# Patient Record
Sex: MALE | Race: WHITE | Employment: OTHER | URBAN - METROPOLITAN AREA
[De-identification: names, ages, dates, MRNs, and addresses within clinical notes are randomized per-mention and may not be internally consistent; named-entity substitution may affect disease eponyms.]

---

## 2017-03-27 ENCOUNTER — HOSPITAL ENCOUNTER (OUTPATIENT)
Dept: PHARMACY | Age: 67
Setting detail: THERAPIES SERIES
Discharge: HOME | End: 2017-03-27
Payer: MEDICARE

## 2017-03-27 LAB
INR BLD: 2.4
PROTIME: 28.8 SECONDS

## 2017-03-27 PROCEDURE — G0463 HOSPITAL OUTPT CLINIC VISIT: HCPCS

## 2017-03-27 PROCEDURE — 85610 PROTHROMBIN TIME: CPT

## 2017-04-24 ENCOUNTER — HOSPITAL ENCOUNTER (OUTPATIENT)
Dept: PHARMACY | Age: 67
Setting detail: THERAPIES SERIES
Discharge: HOME | End: 2017-04-24
Payer: MEDICARE

## 2017-04-24 DIAGNOSIS — I82.409 DEEP VEIN THROMBOSIS (DVT) OF LOWER EXTREMITY, UNSPECIFIED CHRONICITY, UNSPECIFIED LATERALITY, UNSPECIFIED VEIN (HCC): ICD-10-CM

## 2017-04-24 LAB
INR BLD: 2.6
PROTIME: 31.8 SECONDS

## 2017-04-24 PROCEDURE — G0463 HOSPITAL OUTPT CLINIC VISIT: HCPCS

## 2017-04-24 PROCEDURE — 85610 PROTHROMBIN TIME: CPT

## 2017-05-22 ENCOUNTER — HOSPITAL ENCOUNTER (OUTPATIENT)
Dept: PHARMACY | Age: 67
Setting detail: THERAPIES SERIES
Discharge: HOME | End: 2017-05-22
Payer: MEDICARE

## 2017-05-22 DIAGNOSIS — I82.409 DEEP VEIN THROMBOSIS (DVT) OF LOWER EXTREMITY, UNSPECIFIED CHRONICITY, UNSPECIFIED LATERALITY, UNSPECIFIED VEIN (HCC): ICD-10-CM

## 2017-05-22 LAB
INR BLD: 1.7
PROTIME: 20.8 SECONDS

## 2017-05-22 PROCEDURE — 99211 OFF/OP EST MAY X REQ PHY/QHP: CPT

## 2017-05-22 PROCEDURE — 85610 PROTHROMBIN TIME: CPT

## 2017-06-19 ENCOUNTER — HOSPITAL ENCOUNTER (OUTPATIENT)
Dept: PHARMACY | Age: 67
Setting detail: THERAPIES SERIES
Discharge: HOME | End: 2017-06-19
Payer: MEDICARE

## 2017-06-19 DIAGNOSIS — I82.409 DEEP VEIN THROMBOSIS (DVT) OF LOWER EXTREMITY, UNSPECIFIED CHRONICITY, UNSPECIFIED LATERALITY, UNSPECIFIED VEIN (HCC): ICD-10-CM

## 2017-06-19 LAB
INR BLD: 2
PROTIME: 24.4 SECONDS

## 2017-06-19 PROCEDURE — 85610 PROTHROMBIN TIME: CPT

## 2017-06-19 PROCEDURE — 99211 OFF/OP EST MAY X REQ PHY/QHP: CPT

## 2017-07-17 ENCOUNTER — HOSPITAL ENCOUNTER (OUTPATIENT)
Dept: PHARMACY | Age: 67
Setting detail: THERAPIES SERIES
Discharge: HOME | End: 2017-07-17
Payer: MEDICARE

## 2017-07-17 DIAGNOSIS — I82.409 DEEP VEIN THROMBOSIS (DVT) OF LOWER EXTREMITY, UNSPECIFIED CHRONICITY, UNSPECIFIED LATERALITY, UNSPECIFIED VEIN (HCC): ICD-10-CM

## 2017-07-17 LAB
INR BLD: 2.4
PROTIME: 28.8 SECONDS

## 2017-07-17 PROCEDURE — 85610 PROTHROMBIN TIME: CPT

## 2017-07-17 PROCEDURE — 99211 OFF/OP EST MAY X REQ PHY/QHP: CPT

## 2017-07-17 RX ORDER — WARFARIN SODIUM 10 MG/1
5-10 TABLET ORAL DAILY
Qty: 30 TABLET | Refills: 2 | OUTPATIENT
Start: 2017-07-17 | End: 2017-09-11 | Stop reason: SDUPTHER

## 2017-08-14 ENCOUNTER — HOSPITAL ENCOUNTER (OUTPATIENT)
Dept: PHARMACY | Age: 67
Setting detail: THERAPIES SERIES
Discharge: HOME | End: 2017-08-14
Payer: MEDICARE

## 2017-08-14 DIAGNOSIS — I82.409 DEEP VEIN THROMBOSIS (DVT) OF LOWER EXTREMITY, UNSPECIFIED CHRONICITY, UNSPECIFIED LATERALITY, UNSPECIFIED VEIN (HCC): ICD-10-CM

## 2017-08-14 LAB
INR BLD: 2.7
PROTIME: 31.9 SECONDS

## 2017-08-14 PROCEDURE — 85610 PROTHROMBIN TIME: CPT

## 2017-08-14 PROCEDURE — 99211 OFF/OP EST MAY X REQ PHY/QHP: CPT

## 2017-09-07 ENCOUNTER — TELEPHONE (OUTPATIENT)
Dept: PULMONOLOGY | Age: 67
End: 2017-09-07

## 2017-09-07 DIAGNOSIS — Z99.89 OSA ON CPAP: Primary | ICD-10-CM

## 2017-09-07 DIAGNOSIS — I27.20 PULMONARY HYPERTENSION (HCC): ICD-10-CM

## 2017-09-07 DIAGNOSIS — J96.00 ACUTE RESPIRATORY FAILURE, UNSPECIFIED WHETHER WITH HYPOXIA OR HYPERCAPNIA (HCC): ICD-10-CM

## 2017-09-07 DIAGNOSIS — G47.33 OSA ON CPAP: Primary | ICD-10-CM

## 2017-09-11 ENCOUNTER — HOSPITAL ENCOUNTER (OUTPATIENT)
Dept: PHARMACY | Age: 67
Setting detail: THERAPIES SERIES
Discharge: HOME | End: 2017-09-11
Payer: MEDICARE

## 2017-09-11 LAB
INR BLD: 2.8
PROTIME: 33.1 SECONDS

## 2017-09-11 PROCEDURE — 99211 OFF/OP EST MAY X REQ PHY/QHP: CPT

## 2017-09-11 PROCEDURE — 85610 PROTHROMBIN TIME: CPT

## 2017-09-11 RX ORDER — WARFARIN SODIUM 10 MG/1
5-10 TABLET ORAL DAILY
Qty: 30 TABLET | Refills: 2 | OUTPATIENT
Start: 2017-09-11 | End: 2017-10-09 | Stop reason: SDUPTHER

## 2017-09-22 ENCOUNTER — OFFICE VISIT (OUTPATIENT)
Dept: PULMONOLOGY | Age: 67
End: 2017-09-22
Payer: MEDICARE

## 2017-09-22 VITALS
OXYGEN SATURATION: 93 % | SYSTOLIC BLOOD PRESSURE: 144 MMHG | HEART RATE: 83 BPM | RESPIRATION RATE: 18 BRPM | BODY MASS INDEX: 46.65 KG/M2 | WEIGHT: 315 LBS | HEIGHT: 69 IN | DIASTOLIC BLOOD PRESSURE: 74 MMHG

## 2017-09-22 DIAGNOSIS — I27.20 PULMONARY HYPERTENSION (HCC): ICD-10-CM

## 2017-09-22 DIAGNOSIS — Z99.89 OSA ON CPAP: Primary | ICD-10-CM

## 2017-09-22 DIAGNOSIS — J96.00 ACUTE RESPIRATORY FAILURE, UNSPECIFIED WHETHER WITH HYPOXIA OR HYPERCAPNIA (HCC): ICD-10-CM

## 2017-09-22 DIAGNOSIS — G47.33 OSA ON CPAP: Primary | ICD-10-CM

## 2017-09-22 DIAGNOSIS — E66.01 MORBID OBESITY DUE TO EXCESS CALORIES (HCC): ICD-10-CM

## 2017-09-22 DIAGNOSIS — I48.0 PAF (PAROXYSMAL ATRIAL FIBRILLATION) (HCC): ICD-10-CM

## 2017-09-22 PROCEDURE — 3017F COLORECTAL CA SCREEN DOC REV: CPT | Performed by: INTERNAL MEDICINE

## 2017-09-22 PROCEDURE — G8427 DOCREV CUR MEDS BY ELIG CLIN: HCPCS | Performed by: INTERNAL MEDICINE

## 2017-09-22 PROCEDURE — G8417 CALC BMI ABV UP PARAM F/U: HCPCS | Performed by: INTERNAL MEDICINE

## 2017-09-22 PROCEDURE — 99213 OFFICE O/P EST LOW 20 MIN: CPT | Performed by: INTERNAL MEDICINE

## 2017-09-22 PROCEDURE — 4040F PNEUMOC VAC/ADMIN/RCVD: CPT | Performed by: INTERNAL MEDICINE

## 2017-09-22 PROCEDURE — 1123F ACP DISCUSS/DSCN MKR DOCD: CPT | Performed by: INTERNAL MEDICINE

## 2017-09-22 PROCEDURE — 1036F TOBACCO NON-USER: CPT | Performed by: INTERNAL MEDICINE

## 2017-09-22 RX ORDER — LANOLIN ALCOHOL/MO/W.PET/CERES
1000 CREAM (GRAM) TOPICAL DAILY
COMMUNITY

## 2017-09-22 ASSESSMENT — ENCOUNTER SYMPTOMS
WHEEZING: 1
BACK PAIN: 1
EYES NEGATIVE: 1
SHORTNESS OF BREATH: 1
CHEST TIGHTNESS: 1
APNEA: 1

## 2017-10-09 ENCOUNTER — HOSPITAL ENCOUNTER (OUTPATIENT)
Dept: PHARMACY | Age: 67
Setting detail: THERAPIES SERIES
Discharge: HOME | End: 2017-10-09
Payer: MEDICARE

## 2017-10-09 LAB
INR BLD: 2.4
PROTIME: 29 SECONDS

## 2017-10-09 PROCEDURE — 99211 OFF/OP EST MAY X REQ PHY/QHP: CPT

## 2017-10-09 PROCEDURE — 85610 PROTHROMBIN TIME: CPT

## 2017-10-09 RX ORDER — WARFARIN SODIUM 10 MG/1
5-10 TABLET ORAL DAILY
Qty: 90 TABLET | Refills: 1 | OUTPATIENT
Start: 2017-10-09 | End: 2018-06-05 | Stop reason: SDUPTHER

## 2017-11-06 ENCOUNTER — HOSPITAL ENCOUNTER (OUTPATIENT)
Dept: PHARMACY | Age: 67
Setting detail: THERAPIES SERIES
Discharge: HOME | End: 2017-11-06
Payer: MEDICARE

## 2017-11-06 LAB
INR BLD: 1.5
PROTIME: 17.8 SECONDS

## 2017-11-06 PROCEDURE — 99211 OFF/OP EST MAY X REQ PHY/QHP: CPT

## 2017-11-06 PROCEDURE — 85610 PROTHROMBIN TIME: CPT

## 2017-11-06 NOTE — PROGRESS NOTES
Patient states he missed Saturday's dose and maybe missed another day last week as well. No bleeding or thromboembolic side effects noted. No significant med or dietary changes. No significant recent illness or disease state changes. PT/INR done in office per protocol. INR is 1.5 which is subtherapeutic as expected from missed doses. Warfarin regimen will be 10mg x 1, then continue usual regimen of 5mg Mon/Fri, 10mg all other days. Will retest in 4 weeks as he is usually therapeutic and stable at this regimen. Patient understands dosing directions and information discussed. Dosing schedule and follow up appointment given to patient. Progress note routed to referring physicians office.

## 2017-12-11 ENCOUNTER — HOSPITAL ENCOUNTER (OUTPATIENT)
Dept: PHARMACY | Age: 67
Setting detail: THERAPIES SERIES
Discharge: HOME | End: 2017-12-11
Payer: MEDICARE

## 2017-12-11 DIAGNOSIS — I82.409 DEEP VEIN THROMBOSIS (DVT) OF LOWER EXTREMITY, UNSPECIFIED CHRONICITY, UNSPECIFIED LATERALITY, UNSPECIFIED VEIN (HCC): ICD-10-CM

## 2017-12-11 LAB
INR BLD: 2.3
PROTIME: 27.9 SECONDS

## 2017-12-11 PROCEDURE — 85610 PROTHROMBIN TIME: CPT

## 2017-12-11 PROCEDURE — 99211 OFF/OP EST MAY X REQ PHY/QHP: CPT

## 2018-01-15 ENCOUNTER — HOSPITAL ENCOUNTER (OUTPATIENT)
Dept: PHARMACY | Age: 68
Setting detail: THERAPIES SERIES
Discharge: HOME | End: 2018-01-15
Payer: MEDICARE

## 2018-01-15 DIAGNOSIS — I82.409 DEEP VEIN THROMBOSIS (DVT) OF LOWER EXTREMITY, UNSPECIFIED CHRONICITY, UNSPECIFIED LATERALITY, UNSPECIFIED VEIN (HCC): ICD-10-CM

## 2018-01-15 LAB
INR BLD: 2.4
PROTIME: 29.4 SECONDS

## 2018-01-15 PROCEDURE — 85610 PROTHROMBIN TIME: CPT

## 2018-01-15 PROCEDURE — 99211 OFF/OP EST MAY X REQ PHY/QHP: CPT

## 2018-02-19 ENCOUNTER — HOSPITAL ENCOUNTER (OUTPATIENT)
Dept: PHARMACY | Age: 68
Setting detail: THERAPIES SERIES
Discharge: HOME | End: 2018-02-19
Payer: MEDICARE

## 2018-02-19 DIAGNOSIS — I82.409 DEEP VEIN THROMBOSIS (DVT) OF LOWER EXTREMITY, UNSPECIFIED CHRONICITY, UNSPECIFIED LATERALITY, UNSPECIFIED VEIN (HCC): ICD-10-CM

## 2018-02-19 LAB
INR BLD: 2.1
PROTIME: 25.3 SECONDS

## 2018-02-19 PROCEDURE — 99211 OFF/OP EST MAY X REQ PHY/QHP: CPT

## 2018-02-19 PROCEDURE — 85610 PROTHROMBIN TIME: CPT

## 2018-02-19 NOTE — PROGRESS NOTES
Patient states he may have missed 1 or 2 doses over the past month. No bleeding or thromboembolic side effects noted. No significant med or dietary changes. No significant recent illness or disease state changes. PT/INR done in office per protocol. INR is 2.1 which is therapeutic. Warfarin regimen will be continued at current dose. Will retest in 5 weeks. Patient understands dosing directions and information discussed. Dosing schedule and follow up appointment given to patient. Progress note routed to referring physicians office. Patient acknowledges working in consult agreement with pharmacist as referred by his/her physician.

## 2018-03-26 ENCOUNTER — HOSPITAL ENCOUNTER (OUTPATIENT)
Dept: PHARMACY | Age: 68
Setting detail: THERAPIES SERIES
Discharge: HOME | End: 2018-03-26
Payer: MEDICARE

## 2018-03-26 DIAGNOSIS — I82.409 DEEP VEIN THROMBOSIS (DVT) OF LOWER EXTREMITY, UNSPECIFIED CHRONICITY, UNSPECIFIED LATERALITY, UNSPECIFIED VEIN (HCC): ICD-10-CM

## 2018-03-26 LAB
INR BLD: 1.7
PROTIME: 20.8 SECONDS

## 2018-03-26 PROCEDURE — 85610 PROTHROMBIN TIME: CPT

## 2018-03-26 PROCEDURE — 99211 OFF/OP EST MAY X REQ PHY/QHP: CPT

## 2018-03-26 NOTE — PROGRESS NOTES
Patient states may have missed some doses. No bleeding or thromboembolic side effects noted. No significant med or dietary changes. No significant recent illness or disease state changes. PT/INR done in office per protocol. INR is 1.7 which is subtherapeutic. Warfarin regimen will be continued with 10mg x 1 then continue usual dosage 5mg M/F and 10mg AOD. Will retest in 4 weeks. Patient understands dosing directions and information discussed. Dosing schedule and follow up appointment given to patient. Progress note routed to referring physicians office. Patient acknowledges working in consult agreement with pharmacist as referred by his/her physician.

## 2018-04-24 ENCOUNTER — HOSPITAL ENCOUNTER (OUTPATIENT)
Dept: PHARMACY | Age: 68
Setting detail: THERAPIES SERIES
Discharge: HOME | End: 2018-04-24
Payer: MEDICARE

## 2018-04-24 DIAGNOSIS — I82.409 DEEP VEIN THROMBOSIS (DVT) OF LOWER EXTREMITY, UNSPECIFIED CHRONICITY, UNSPECIFIED LATERALITY, UNSPECIFIED VEIN (HCC): ICD-10-CM

## 2018-04-24 LAB
INR BLD: 2.9
PROTIME: 35.3 SECONDS

## 2018-04-24 PROCEDURE — 85610 PROTHROMBIN TIME: CPT

## 2018-04-24 PROCEDURE — 99211 OFF/OP EST MAY X REQ PHY/QHP: CPT

## 2018-05-21 ENCOUNTER — HOSPITAL ENCOUNTER (OUTPATIENT)
Dept: PHARMACY | Age: 68
Setting detail: THERAPIES SERIES
Discharge: HOME | End: 2018-05-21
Payer: MEDICARE

## 2018-05-21 DIAGNOSIS — I82.409 DEEP VEIN THROMBOSIS (DVT) OF LOWER EXTREMITY, UNSPECIFIED CHRONICITY, UNSPECIFIED LATERALITY, UNSPECIFIED VEIN (HCC): ICD-10-CM

## 2018-05-21 LAB
INR BLD: 2.9
PROTIME: 35.2 SECONDS

## 2018-05-21 PROCEDURE — 99211 OFF/OP EST MAY X REQ PHY/QHP: CPT

## 2018-05-21 PROCEDURE — 85610 PROTHROMBIN TIME: CPT

## 2018-05-25 DIAGNOSIS — I82.409 DEEP VEIN THROMBOSIS (DVT) OF LOWER EXTREMITY, UNSPECIFIED CHRONICITY, UNSPECIFIED LATERALITY, UNSPECIFIED VEIN (HCC): ICD-10-CM

## 2018-06-05 RX ORDER — WARFARIN SODIUM 10 MG/1
TABLET ORAL
Qty: 90 TABLET | Refills: 3 | OUTPATIENT
Start: 2018-06-05 | End: 2019-08-13

## 2018-06-18 ENCOUNTER — HOSPITAL ENCOUNTER (OUTPATIENT)
Dept: PHARMACY | Age: 68
Setting detail: THERAPIES SERIES
Discharge: HOME | End: 2018-06-18
Payer: MEDICARE

## 2018-06-18 DIAGNOSIS — I82.409 DEEP VEIN THROMBOSIS (DVT) OF LOWER EXTREMITY, UNSPECIFIED CHRONICITY, UNSPECIFIED LATERALITY, UNSPECIFIED VEIN (HCC): ICD-10-CM

## 2018-06-18 LAB
INR BLD: 2.6
PROTIME: 31 SECONDS

## 2018-06-18 PROCEDURE — 85610 PROTHROMBIN TIME: CPT

## 2018-06-18 PROCEDURE — 99211 OFF/OP EST MAY X REQ PHY/QHP: CPT

## 2018-07-16 ENCOUNTER — HOSPITAL ENCOUNTER (OUTPATIENT)
Dept: PHARMACY | Age: 68
Setting detail: THERAPIES SERIES
Discharge: HOME | End: 2018-07-16
Payer: MEDICARE

## 2018-07-16 DIAGNOSIS — I82.409 DEEP VEIN THROMBOSIS (DVT) OF LOWER EXTREMITY, UNSPECIFIED CHRONICITY, UNSPECIFIED LATERALITY, UNSPECIFIED VEIN (HCC): ICD-10-CM

## 2018-07-16 LAB
INR BLD: 1.5
PROTIME: 18 SECONDS

## 2018-07-16 PROCEDURE — 99211 OFF/OP EST MAY X REQ PHY/QHP: CPT

## 2018-07-16 PROCEDURE — 85610 PROTHROMBIN TIME: CPT

## 2018-07-30 ENCOUNTER — HOSPITAL ENCOUNTER (OUTPATIENT)
Dept: PHARMACY | Age: 68
Setting detail: THERAPIES SERIES
Discharge: HOME | End: 2018-07-30
Payer: MEDICARE

## 2018-07-30 DIAGNOSIS — I82.409 DEEP VEIN THROMBOSIS (DVT) OF LOWER EXTREMITY, UNSPECIFIED CHRONICITY, UNSPECIFIED LATERALITY, UNSPECIFIED VEIN (HCC): ICD-10-CM

## 2018-07-30 LAB
INR BLD: 2.7
PROTIME: 32.7 SECONDS

## 2018-07-30 PROCEDURE — 85610 PROTHROMBIN TIME: CPT

## 2018-07-30 PROCEDURE — 99211 OFF/OP EST MAY X REQ PHY/QHP: CPT

## 2018-07-30 RX ORDER — PREGABALIN 25 MG/1
25 CAPSULE ORAL 2 TIMES DAILY
Status: ON HOLD | COMMUNITY
End: 2019-09-05 | Stop reason: HOSPADM

## 2018-08-27 ENCOUNTER — HOSPITAL ENCOUNTER (OUTPATIENT)
Dept: PHARMACY | Age: 68
Setting detail: THERAPIES SERIES
Discharge: HOME | End: 2018-08-27
Payer: MEDICARE

## 2018-08-27 DIAGNOSIS — I82.409 DEEP VEIN THROMBOSIS (DVT) OF LOWER EXTREMITY, UNSPECIFIED CHRONICITY, UNSPECIFIED LATERALITY, UNSPECIFIED VEIN (HCC): ICD-10-CM

## 2018-08-27 LAB
INR BLD: 1.6
PROTIME: 19.2 SECONDS

## 2018-08-27 PROCEDURE — 85610 PROTHROMBIN TIME: CPT

## 2018-08-27 PROCEDURE — 99212 OFFICE O/P EST SF 10 MIN: CPT

## 2018-09-10 ENCOUNTER — HOSPITAL ENCOUNTER (OUTPATIENT)
Dept: PHARMACY | Age: 68
Setting detail: THERAPIES SERIES
Discharge: HOME | End: 2018-09-10
Payer: MEDICARE

## 2018-09-10 DIAGNOSIS — I82.409 DEEP VEIN THROMBOSIS (DVT) OF LOWER EXTREMITY, UNSPECIFIED CHRONICITY, UNSPECIFIED LATERALITY, UNSPECIFIED VEIN (HCC): ICD-10-CM

## 2018-09-10 LAB
INR BLD: 2.3
PROTIME: 28 SECONDS

## 2018-09-10 PROCEDURE — 85610 PROTHROMBIN TIME: CPT

## 2018-09-10 PROCEDURE — 99211 OFF/OP EST MAY X REQ PHY/QHP: CPT

## 2018-09-14 ENCOUNTER — OFFICE VISIT (OUTPATIENT)
Dept: PULMONOLOGY | Age: 68
End: 2018-09-14
Payer: MEDICARE

## 2018-09-14 VITALS
HEART RATE: 87 BPM | SYSTOLIC BLOOD PRESSURE: 132 MMHG | TEMPERATURE: 97 F | DIASTOLIC BLOOD PRESSURE: 73 MMHG | HEIGHT: 69 IN | OXYGEN SATURATION: 93 % | BODY MASS INDEX: 46.65 KG/M2 | WEIGHT: 315 LBS | RESPIRATION RATE: 12 BRPM

## 2018-09-14 DIAGNOSIS — J96.00 ACUTE RESPIRATORY FAILURE, UNSPECIFIED WHETHER WITH HYPOXIA OR HYPERCAPNIA (HCC): ICD-10-CM

## 2018-09-14 DIAGNOSIS — I48.0 PAF (PAROXYSMAL ATRIAL FIBRILLATION) (HCC): ICD-10-CM

## 2018-09-14 DIAGNOSIS — G47.33 OSA ON CPAP: Primary | ICD-10-CM

## 2018-09-14 DIAGNOSIS — E66.01 MORBID OBESITY DUE TO EXCESS CALORIES (HCC): ICD-10-CM

## 2018-09-14 DIAGNOSIS — Z99.89 OSA ON CPAP: Primary | ICD-10-CM

## 2018-09-14 DIAGNOSIS — I27.20 PULMONARY HYPERTENSION (HCC): ICD-10-CM

## 2018-09-14 PROCEDURE — G8417 CALC BMI ABV UP PARAM F/U: HCPCS | Performed by: INTERNAL MEDICINE

## 2018-09-14 PROCEDURE — G8427 DOCREV CUR MEDS BY ELIG CLIN: HCPCS | Performed by: INTERNAL MEDICINE

## 2018-09-14 PROCEDURE — 3017F COLORECTAL CA SCREEN DOC REV: CPT | Performed by: INTERNAL MEDICINE

## 2018-09-14 PROCEDURE — 1036F TOBACCO NON-USER: CPT | Performed by: INTERNAL MEDICINE

## 2018-09-14 PROCEDURE — 4040F PNEUMOC VAC/ADMIN/RCVD: CPT | Performed by: INTERNAL MEDICINE

## 2018-09-14 PROCEDURE — 1123F ACP DISCUSS/DSCN MKR DOCD: CPT | Performed by: INTERNAL MEDICINE

## 2018-09-14 PROCEDURE — 1101F PT FALLS ASSESS-DOCD LE1/YR: CPT | Performed by: INTERNAL MEDICINE

## 2018-09-14 PROCEDURE — 99213 OFFICE O/P EST LOW 20 MIN: CPT | Performed by: INTERNAL MEDICINE

## 2018-09-14 ASSESSMENT — ENCOUNTER SYMPTOMS
EYES NEGATIVE: 1
RESPIRATORY NEGATIVE: 1

## 2018-09-14 NOTE — PROGRESS NOTES
and time. Skin: Skin is warm and dry. Nursing note and vitals reviewed. Wt Readings from Last 3 Encounters:   09/14/18 (!) 430 lb (195 kg)   09/22/17 (!) 438 lb (198.7 kg)   12/13/16 (!) 412 lb (186.9 kg)       Results for orders placed or performed during the hospital encounter of 09/10/18   Protime-INR   Result Value Ref Range    INR 2.3     Protime 28 seconds       Assessment:      1. STEPH on CPAP    2. Pulmonary hypertension ([address]    3. Acute respiratory failure, unspecified whether with hypoxia or hypercapnia ([address]    4. PAF (paroxysmal atrial fibrillation) ([address]    5. Morbid obesity due to excess calories ([address]          Plan:      1. Continue weight loss. 2. Continue CPAP. 3. When eligible for a replacement machine, recommend auto titrating CPAP 8-16 cm water pressure. 4. Avoid sedative hypnotics and alcohol at bedtime. 5. Return in one year.       Electronically signed by Scott Strong DO on 9/14/2018 at 2:34 PM

## 2018-10-08 ENCOUNTER — HOSPITAL ENCOUNTER (OUTPATIENT)
Dept: PHARMACY | Age: 68
Setting detail: THERAPIES SERIES
Discharge: HOME | End: 2018-10-08
Payer: MEDICARE

## 2018-10-08 DIAGNOSIS — I82.409 DEEP VEIN THROMBOSIS (DVT) OF LOWER EXTREMITY, UNSPECIFIED CHRONICITY, UNSPECIFIED LATERALITY, UNSPECIFIED VEIN (HCC): ICD-10-CM

## 2018-10-08 LAB
INR BLD: 1.5
PROTIME: 17.7 SECONDS

## 2018-10-08 PROCEDURE — 90686 IIV4 VACC NO PRSV 0.5 ML IM: CPT | Performed by: INTERNAL MEDICINE

## 2018-10-08 PROCEDURE — G0009 ADMIN PNEUMOCOCCAL VACCINE: HCPCS | Performed by: INTERNAL MEDICINE

## 2018-10-08 PROCEDURE — 6360000002 HC RX W HCPCS: Performed by: INTERNAL MEDICINE

## 2018-10-08 PROCEDURE — 90670 PCV13 VACCINE IM: CPT | Performed by: INTERNAL MEDICINE

## 2018-10-08 PROCEDURE — G0008 ADMIN INFLUENZA VIRUS VAC: HCPCS | Performed by: INTERNAL MEDICINE

## 2018-10-08 PROCEDURE — 99212 OFFICE O/P EST SF 10 MIN: CPT

## 2018-10-08 PROCEDURE — 85610 PROTHROMBIN TIME: CPT

## 2018-10-08 RX ADMIN — PNEUMOCOCCAL 13-VALENT CONJUGATE VACCINE 0.5 ML: 2.2; 2.2; 2.2; 2.2; 2.2; 4.4; 2.2; 2.2; 2.2; 2.2; 2.2; 2.2; 2.2 INJECTION, SUSPENSION INTRAMUSCULAR at 08:06

## 2018-10-08 RX ADMIN — INFLUENZA A VIRUS A/MICHIGAN/45/2015 X-275 (H1N1) ANTIGEN (FORMALDEHYDE INACTIVATED), INFLUENZA A VIRUS A/SINGAPORE/INFIMH-16-0019/2016 IVR-186 (H3N2) ANTIGEN (FORMALDEHYDE INACTIVATED), INFLUENZA B VIRUS B/PHUKET/3073/2013 ANTIGEN (FORMALDEHYDE INACTIVATED), AND INFLUENZA B VIRUS B/MARYLAND/15/2016 BX-69A ANTIGEN (FORMALDEHYDE INACTIVATED) 0.5 ML: 15; 15; 15; 15 INJECTION, SUSPENSION INTRAMUSCULAR at 08:05

## 2018-10-22 ENCOUNTER — HOSPITAL ENCOUNTER (OUTPATIENT)
Dept: PHARMACY | Age: 68
Setting detail: THERAPIES SERIES
Discharge: HOME | End: 2018-10-22
Payer: MEDICARE

## 2018-10-22 DIAGNOSIS — I82.409 DEEP VEIN THROMBOSIS (DVT) OF LOWER EXTREMITY, UNSPECIFIED CHRONICITY, UNSPECIFIED LATERALITY, UNSPECIFIED VEIN (HCC): ICD-10-CM

## 2018-10-22 LAB
INR BLD: 2.2
PROTIME: 26.4 SECONDS

## 2018-10-22 PROCEDURE — 99211 OFF/OP EST MAY X REQ PHY/QHP: CPT

## 2018-10-22 PROCEDURE — 85610 PROTHROMBIN TIME: CPT

## 2018-11-19 ENCOUNTER — HOSPITAL ENCOUNTER (OUTPATIENT)
Dept: PHARMACY | Age: 68
Setting detail: THERAPIES SERIES
Discharge: HOME | End: 2018-11-19
Payer: MEDICARE

## 2018-11-19 DIAGNOSIS — I82.409 DEEP VEIN THROMBOSIS (DVT) OF LOWER EXTREMITY, UNSPECIFIED CHRONICITY, UNSPECIFIED LATERALITY, UNSPECIFIED VEIN (HCC): ICD-10-CM

## 2018-11-19 LAB
INR BLD: 2.3
PROTIME: 27.4 SECONDS

## 2018-11-19 PROCEDURE — 85610 PROTHROMBIN TIME: CPT

## 2018-11-19 PROCEDURE — 99211 OFF/OP EST MAY X REQ PHY/QHP: CPT

## 2018-12-17 ENCOUNTER — HOSPITAL ENCOUNTER (OUTPATIENT)
Dept: PHARMACY | Age: 68
Setting detail: THERAPIES SERIES
Discharge: HOME | End: 2018-12-17
Payer: MEDICARE

## 2018-12-17 DIAGNOSIS — I82.409 DEEP VEIN THROMBOSIS (DVT) OF LOWER EXTREMITY, UNSPECIFIED CHRONICITY, UNSPECIFIED LATERALITY, UNSPECIFIED VEIN (HCC): ICD-10-CM

## 2018-12-17 LAB
INR BLD: 2.4
PROTIME: 29.2 SECONDS

## 2018-12-17 PROCEDURE — 99211 OFF/OP EST MAY X REQ PHY/QHP: CPT

## 2018-12-17 PROCEDURE — 85610 PROTHROMBIN TIME: CPT

## 2019-01-21 ENCOUNTER — HOSPITAL ENCOUNTER (OUTPATIENT)
Dept: PHARMACY | Age: 69
Setting detail: THERAPIES SERIES
Discharge: HOME | End: 2019-01-21
Payer: MEDICARE

## 2019-01-21 DIAGNOSIS — I82.409 DEEP VEIN THROMBOSIS (DVT) OF LOWER EXTREMITY, UNSPECIFIED CHRONICITY, UNSPECIFIED LATERALITY, UNSPECIFIED VEIN (HCC): ICD-10-CM

## 2019-01-21 LAB
INR BLD: 3.7
PROTIME: 45 SECONDS

## 2019-01-21 PROCEDURE — 99212 OFFICE O/P EST SF 10 MIN: CPT

## 2019-01-21 PROCEDURE — 85610 PROTHROMBIN TIME: CPT

## 2019-02-04 ENCOUNTER — HOSPITAL ENCOUNTER (OUTPATIENT)
Dept: PHARMACY | Age: 69
Setting detail: THERAPIES SERIES
Discharge: HOME | End: 2019-02-04
Payer: MEDICARE

## 2019-02-04 DIAGNOSIS — I82.409 DEEP VEIN THROMBOSIS (DVT) OF LOWER EXTREMITY, UNSPECIFIED CHRONICITY, UNSPECIFIED LATERALITY, UNSPECIFIED VEIN (HCC): ICD-10-CM

## 2019-02-04 LAB
INR BLD: 2.6
PROTIME: 30.6 SECONDS

## 2019-02-04 PROCEDURE — 99211 OFF/OP EST MAY X REQ PHY/QHP: CPT

## 2019-02-04 PROCEDURE — 85610 PROTHROMBIN TIME: CPT

## 2019-03-04 ENCOUNTER — HOSPITAL ENCOUNTER (OUTPATIENT)
Dept: PHARMACY | Age: 69
Setting detail: THERAPIES SERIES
Discharge: HOME | End: 2019-03-04
Payer: MEDICARE

## 2019-03-04 DIAGNOSIS — I82.409 DEEP VEIN THROMBOSIS (DVT) OF LOWER EXTREMITY, UNSPECIFIED CHRONICITY, UNSPECIFIED LATERALITY, UNSPECIFIED VEIN (HCC): ICD-10-CM

## 2019-03-04 LAB
INR BLD: 2.2
PROTIME: 25.9 SECONDS

## 2019-03-04 PROCEDURE — 99211 OFF/OP EST MAY X REQ PHY/QHP: CPT

## 2019-03-04 PROCEDURE — 85610 PROTHROMBIN TIME: CPT

## 2019-04-01 ENCOUNTER — HOSPITAL ENCOUNTER (OUTPATIENT)
Dept: PHARMACY | Age: 69
Setting detail: THERAPIES SERIES
Discharge: HOME | End: 2019-04-01
Payer: MEDICARE

## 2019-04-01 DIAGNOSIS — I82.409 DEEP VEIN THROMBOSIS (DVT) OF LOWER EXTREMITY, UNSPECIFIED CHRONICITY, UNSPECIFIED LATERALITY, UNSPECIFIED VEIN (HCC): ICD-10-CM

## 2019-04-01 LAB
INR BLD: 1.9
PROTIME: 23 SECONDS

## 2019-04-01 PROCEDURE — 85610 PROTHROMBIN TIME: CPT

## 2019-04-01 PROCEDURE — 99211 OFF/OP EST MAY X REQ PHY/QHP: CPT

## 2019-05-06 ENCOUNTER — HOSPITAL ENCOUNTER (OUTPATIENT)
Dept: PHARMACY | Age: 69
Setting detail: THERAPIES SERIES
Discharge: HOME | End: 2019-05-06
Payer: MEDICARE

## 2019-05-06 LAB
INR BLD: 2.2
PROTIME: 26.6 SECONDS

## 2019-05-06 PROCEDURE — 99211 OFF/OP EST MAY X REQ PHY/QHP: CPT

## 2019-05-06 PROCEDURE — 85610 PROTHROMBIN TIME: CPT

## 2019-06-17 ENCOUNTER — HOSPITAL ENCOUNTER (OUTPATIENT)
Dept: PHARMACY | Age: 69
Setting detail: THERAPIES SERIES
Discharge: HOME | End: 2019-06-17
Payer: MEDICARE

## 2019-06-17 DIAGNOSIS — I82.499 DEEP VEIN THROMBOSIS (DVT) OF OTHER VEIN OF LOWER EXTREMITY, UNSPECIFIED CHRONICITY, UNSPECIFIED LATERALITY (HCC): ICD-10-CM

## 2019-06-17 LAB
INR BLD: 3.1
PROTIME: 37.2 SECONDS

## 2019-06-17 PROCEDURE — 99211 OFF/OP EST MAY X REQ PHY/QHP: CPT

## 2019-06-17 PROCEDURE — 85610 PROTHROMBIN TIME: CPT

## 2019-06-17 NOTE — PROGRESS NOTES
Patient states compliant most of the time with regimen. No bleeding or thromboembolic side effects noted. No significant dietary changes. No significant recent illness or disease state changes. Pt had an ibuprofen 200mg on Saturday which may have caused the slight increase in INR. PT/INR done in office per protocol. INR is 3.1 which is just above goal.     Warfarin regimen will be continued at current dose of 10mg daily. Will retest in 5 weeks. Patient understands dosing directions and information discussed. Dosing schedule and follow up appointment given to patient. Progress note routed to referring physicians office. Patient acknowledges working in consult agreement with pharmacist as referred by his/her physician.       Judy Delgado, 6/17/2019 8:55 AM

## 2019-07-22 ENCOUNTER — HOSPITAL ENCOUNTER (OUTPATIENT)
Dept: PHARMACY | Age: 69
Setting detail: THERAPIES SERIES
Discharge: HOME | End: 2019-07-22
Payer: MEDICARE

## 2019-07-22 DIAGNOSIS — I82.499 DEEP VEIN THROMBOSIS (DVT) OF OTHER VEIN OF LOWER EXTREMITY, UNSPECIFIED CHRONICITY, UNSPECIFIED LATERALITY (HCC): ICD-10-CM

## 2019-07-22 LAB
INR BLD: 2.9
PROTIME: 34.4 SECONDS

## 2019-07-22 PROCEDURE — 99211 OFF/OP EST MAY X REQ PHY/QHP: CPT

## 2019-07-22 PROCEDURE — 85610 PROTHROMBIN TIME: CPT

## 2019-08-13 DIAGNOSIS — I82.499 DEEP VEIN THROMBOSIS (DVT) OF OTHER VEIN OF LOWER EXTREMITY, UNSPECIFIED CHRONICITY, UNSPECIFIED LATERALITY (HCC): Primary | ICD-10-CM

## 2019-08-13 RX ORDER — WARFARIN SODIUM 10 MG/1
TABLET ORAL
Qty: 90 TABLET | Refills: 3 | OUTPATIENT
Start: 2019-08-13 | End: 2020-09-21 | Stop reason: SDUPTHER

## 2019-08-26 ENCOUNTER — HOSPITAL ENCOUNTER (OUTPATIENT)
Dept: PHARMACY | Age: 69
Setting detail: THERAPIES SERIES
Discharge: HOME | End: 2019-08-26
Payer: MEDICARE

## 2019-08-26 DIAGNOSIS — I82.499 DEEP VEIN THROMBOSIS (DVT) OF OTHER VEIN OF LOWER EXTREMITY, UNSPECIFIED CHRONICITY, UNSPECIFIED LATERALITY (HCC): ICD-10-CM

## 2019-08-26 LAB
INR BLD: 3.5
PROTIME: 41.5 SECONDS

## 2019-08-26 PROCEDURE — 85610 PROTHROMBIN TIME: CPT

## 2019-08-26 PROCEDURE — 99212 OFFICE O/P EST SF 10 MIN: CPT

## 2019-08-26 NOTE — PROGRESS NOTES
Patient states compliant all of the time with regimen. No bleeding or thromboembolic side effects noted. No significant med or dietary changes. No significant recent illness or disease state changes. Patient states he has been in more pain recently and has been taking Tylenol 3 and heating pads for back pain. PT/INR done in office per protocol. INR is 3.5 which is supratherapeutic; possibly due to pain and discomfort over past two weeks. Warfarin regimen will be held for one dose then resumed at 10mg daily. Will retest in 2 weeks to make sure INR has normalized. Patient understands dosing directions and information discussed. Dosing schedule and follow up appointment given to patient. Progress note routed to referring physicians office. Patient acknowledges working in consult agreement with pharmacist as referred by his/her physician.       Brendan Eid, 8/26/2019 7:37 AM

## 2019-09-01 ENCOUNTER — HOSPITAL ENCOUNTER (INPATIENT)
Age: 69
LOS: 4 days | Discharge: HOME | DRG: 683 | End: 2019-09-05
Attending: EMERGENCY MEDICINE | Admitting: INTERNAL MEDICINE
Payer: MEDICARE

## 2019-09-01 DIAGNOSIS — N17.9 AKI (ACUTE KIDNEY INJURY) (HCC): Primary | ICD-10-CM

## 2019-09-01 LAB
ABSOLUTE EOS #: 0.12 K/UL (ref 0–0.4)
ABSOLUTE IMMATURE GRANULOCYTE: 0 K/UL (ref 0–0.3)
ABSOLUTE LYMPH #: 1.04 K/UL (ref 1–4.8)
ABSOLUTE MONO #: 0.58 K/UL (ref 0.2–0.8)
ALBUMIN SERPL-MCNC: 3.3 G/DL (ref 3.5–5.2)
ALBUMIN/GLOBULIN RATIO: ABNORMAL (ref 1–2.5)
ALP BLD-CCNC: 84 U/L (ref 40–129)
ALT SERPL-CCNC: 49 U/L (ref 5–41)
ANION GAP SERPL CALCULATED.3IONS-SCNC: 14 MMOL/L (ref 9–17)
AST SERPL-CCNC: 88 U/L
ATYPICAL LYMPHOCYTE ABSOLUTE COUNT: 0.46 K/UL
ATYPICAL LYMPHOCYTES: 8 %
BASOPHILS # BLD: 1 %
BASOPHILS ABSOLUTE: 0.06 K/UL (ref 0–0.2)
BILIRUB SERPL-MCNC: 0.61 MG/DL (ref 0.3–1.2)
BUN BLDV-MCNC: 40 MG/DL (ref 8–23)
BUN/CREAT BLD: 11 (ref 9–20)
CALCIUM SERPL-MCNC: 8.4 MG/DL (ref 8.6–10.4)
CHLORIDE BLD-SCNC: 94 MMOL/L (ref 98–107)
CO2: 22 MMOL/L (ref 20–31)
CREAT SERPL-MCNC: 3.63 MG/DL (ref 0.7–1.2)
DIFFERENTIAL TYPE: ABNORMAL
EOSINOPHILS RELATIVE PERCENT: 2 % (ref 1–4)
GFR AFRICAN AMERICAN: 20 ML/MIN
GFR NON-AFRICAN AMERICAN: 17 ML/MIN
GFR SERPL CREATININE-BSD FRML MDRD: ABNORMAL ML/MIN/{1.73_M2}
GFR SERPL CREATININE-BSD FRML MDRD: ABNORMAL ML/MIN/{1.73_M2}
GLUCOSE BLD-MCNC: 116 MG/DL (ref 70–99)
HCT VFR BLD CALC: 39.7 % (ref 40.7–50.3)
HEMOGLOBIN: 12.9 G/DL (ref 13–17)
IMMATURE GRANULOCYTES: 0 %
INR BLD: 2.4
LIPASE: 20 U/L (ref 13–60)
LYMPHOCYTES # BLD: 18 % (ref 24–44)
MCH RBC QN AUTO: 28.2 PG (ref 25.2–33.5)
MCHC RBC AUTO-ENTMCNC: 32.5 G/DL (ref 28–38)
MCV RBC AUTO: 86.9 FL (ref 82.6–102.9)
MONOCYTES # BLD: 10 % (ref 1–7)
MORPHOLOGY: ABNORMAL
MYOGLOBIN: 1116 NG/ML (ref 28–72)
NRBC AUTOMATED: ABNORMAL PER 100 WBC
PDW BLD-RTO: 14.9 % (ref 11.8–14.4)
PLATELET # BLD: 118 K/UL (ref 138–453)
PLATELET ESTIMATE: ABNORMAL
PMV BLD AUTO: 10.6 FL (ref 8.1–13.5)
POTASSIUM SERPL-SCNC: 4.3 MMOL/L (ref 3.7–5.3)
PROTHROMBIN TIME: 23.8 SEC (ref 9.7–11.6)
RBC # BLD: 4.57 M/UL (ref 4.21–5.77)
RBC # BLD: ABNORMAL 10*6/UL
SEG NEUTROPHILS: 61 % (ref 36–66)
SEGMENTED NEUTROPHILS ABSOLUTE COUNT: 3.54 K/UL (ref 1.8–7.7)
SODIUM BLD-SCNC: 130 MMOL/L (ref 135–144)
TOTAL CK: 843 U/L (ref 39–308)
TOTAL PROTEIN: 7 G/DL (ref 6.4–8.3)
WBC # BLD: 5.8 K/UL (ref 3.5–11.3)
WBC # BLD: ABNORMAL 10*3/UL

## 2019-09-01 PROCEDURE — 6360000002 HC RX W HCPCS: Performed by: EMERGENCY MEDICINE

## 2019-09-01 PROCEDURE — 85025 COMPLETE CBC W/AUTO DIFF WBC: CPT

## 2019-09-01 PROCEDURE — 82550 ASSAY OF CK (CPK): CPT

## 2019-09-01 PROCEDURE — 36415 COLL VENOUS BLD VENIPUNCTURE: CPT

## 2019-09-01 PROCEDURE — 6370000000 HC RX 637 (ALT 250 FOR IP): Performed by: EMERGENCY MEDICINE

## 2019-09-01 PROCEDURE — 6370000000 HC RX 637 (ALT 250 FOR IP): Performed by: INTERNAL MEDICINE

## 2019-09-01 PROCEDURE — 1200000000 HC SEMI PRIVATE

## 2019-09-01 PROCEDURE — 83690 ASSAY OF LIPASE: CPT

## 2019-09-01 PROCEDURE — 2580000003 HC RX 258: Performed by: EMERGENCY MEDICINE

## 2019-09-01 PROCEDURE — 2580000003 HC RX 258: Performed by: INTERNAL MEDICINE

## 2019-09-01 PROCEDURE — 83874 ASSAY OF MYOGLOBIN: CPT

## 2019-09-01 PROCEDURE — 80053 COMPREHEN METABOLIC PANEL: CPT

## 2019-09-01 PROCEDURE — 85610 PROTHROMBIN TIME: CPT

## 2019-09-01 PROCEDURE — 99284 EMERGENCY DEPT VISIT MOD MDM: CPT

## 2019-09-01 RX ORDER — AMITRIPTYLINE HYDROCHLORIDE 50 MG/1
50 TABLET, FILM COATED ORAL NIGHTLY
Status: DISCONTINUED | OUTPATIENT
Start: 2019-09-01 | End: 2019-09-05 | Stop reason: HOSPADM

## 2019-09-01 RX ORDER — ONDANSETRON 4 MG/1
4 TABLET, ORALLY DISINTEGRATING ORAL EVERY 6 HOURS PRN
Status: DISCONTINUED | OUTPATIENT
Start: 2019-09-01 | End: 2019-09-05 | Stop reason: HOSPADM

## 2019-09-01 RX ORDER — SODIUM CHLORIDE 0.9 % (FLUSH) 0.9 %
10 SYRINGE (ML) INJECTION PRN
Status: DISCONTINUED | OUTPATIENT
Start: 2019-09-01 | End: 2019-09-05 | Stop reason: HOSPADM

## 2019-09-01 RX ORDER — ONDANSETRON 2 MG/ML
4 INJECTION INTRAMUSCULAR; INTRAVENOUS EVERY 6 HOURS PRN
Status: DISCONTINUED | OUTPATIENT
Start: 2019-09-01 | End: 2019-09-01 | Stop reason: SDUPTHER

## 2019-09-01 RX ORDER — SODIUM CHLORIDE 9 MG/ML
INJECTION, SOLUTION INTRAVENOUS CONTINUOUS
Status: DISCONTINUED | OUTPATIENT
Start: 2019-09-01 | End: 2019-09-01

## 2019-09-01 RX ORDER — SODIUM CHLORIDE 0.9 % (FLUSH) 0.9 %
10 SYRINGE (ML) INJECTION EVERY 12 HOURS SCHEDULED
Status: DISCONTINUED | OUTPATIENT
Start: 2019-09-01 | End: 2019-09-05 | Stop reason: HOSPADM

## 2019-09-01 RX ORDER — HYDROCODONE BITARTRATE AND ACETAMINOPHEN 5; 325 MG/1; MG/1
1 TABLET ORAL ONCE
Status: COMPLETED | OUTPATIENT
Start: 2019-09-01 | End: 2019-09-01

## 2019-09-01 RX ORDER — SODIUM CHLORIDE 0.9 % (FLUSH) 0.9 %
10 SYRINGE (ML) INJECTION EVERY 12 HOURS SCHEDULED
Status: DISCONTINUED | OUTPATIENT
Start: 2019-09-01 | End: 2019-09-03 | Stop reason: SDUPTHER

## 2019-09-01 RX ORDER — ORPHENADRINE CITRATE 30 MG/ML
60 INJECTION INTRAMUSCULAR; INTRAVENOUS ONCE
Status: COMPLETED | OUTPATIENT
Start: 2019-09-01 | End: 2019-09-01

## 2019-09-01 RX ORDER — ONDANSETRON 2 MG/ML
4 INJECTION INTRAMUSCULAR; INTRAVENOUS EVERY 6 HOURS PRN
Status: DISCONTINUED | OUTPATIENT
Start: 2019-09-01 | End: 2019-09-05 | Stop reason: HOSPADM

## 2019-09-01 RX ORDER — SODIUM CHLORIDE 0.9 % (FLUSH) 0.9 %
10 SYRINGE (ML) INJECTION PRN
Status: DISCONTINUED | OUTPATIENT
Start: 2019-09-01 | End: 2019-09-03 | Stop reason: SDUPTHER

## 2019-09-01 RX ORDER — SODIUM CHLORIDE 9 MG/ML
INJECTION, SOLUTION INTRAVENOUS CONTINUOUS
Status: DISCONTINUED | OUTPATIENT
Start: 2019-09-01 | End: 2019-09-05

## 2019-09-01 RX ADMIN — SODIUM CHLORIDE 75 ML/HR: 9 INJECTION, SOLUTION INTRAVENOUS at 20:56

## 2019-09-01 RX ADMIN — AMITRIPTYLINE HYDROCHLORIDE 50 MG: 50 TABLET, FILM COATED ORAL at 23:43

## 2019-09-01 RX ADMIN — SODIUM CHLORIDE: 9 INJECTION, SOLUTION INTRAVENOUS at 23:43

## 2019-09-01 RX ADMIN — HYDROCODONE BITARTRATE AND ACETAMINOPHEN 1 TABLET: 5; 325 TABLET ORAL at 20:38

## 2019-09-01 RX ADMIN — ORPHENADRINE CITRATE 60 MG: 30 INJECTION INTRAMUSCULAR; INTRAVENOUS at 20:44

## 2019-09-01 ASSESSMENT — PAIN DESCRIPTION - ONSET: ONSET: ON-GOING

## 2019-09-01 ASSESSMENT — PAIN DESCRIPTION - LOCATION
LOCATION: BACK
LOCATION: GENERALIZED

## 2019-09-01 ASSESSMENT — PAIN DESCRIPTION - PAIN TYPE: TYPE: CHRONIC PAIN;ACUTE PAIN

## 2019-09-01 ASSESSMENT — ENCOUNTER SYMPTOMS: BACK PAIN: 1

## 2019-09-01 ASSESSMENT — PAIN DESCRIPTION - DESCRIPTORS
DESCRIPTORS: ACHING;DISCOMFORT
DESCRIPTORS: ACHING

## 2019-09-01 ASSESSMENT — PAIN DESCRIPTION - ORIENTATION: ORIENTATION: LOWER

## 2019-09-01 ASSESSMENT — PAIN SCALES - GENERAL
PAINLEVEL_OUTOF10: 7
PAINLEVEL_OUTOF10: 7
PAINLEVEL_OUTOF10: 5

## 2019-09-01 ASSESSMENT — PAIN DESCRIPTION - FREQUENCY: FREQUENCY: INTERMITTENT

## 2019-09-01 NOTE — LETTER
Beneficiary Notification Letter     This [facility] Provider is Participating in an Innovative Payment and [address] [place] [address] [place] from Medicare     Greetings:   Charisma Cruz is participating in a Medicare initiative called the [facility] for [facility]. You are receiving this letter because your health care provider has identified you as a patient who is receiving care through this initiative. Health care providers participating in the [facility], including Charisma Cruz, will work with Medicare to improve care for patients. Your Medicare rights have not been changed. You still have all the same Medicare rights and protections, including the right to choose which hospital, doctor, or other health care provider you see. However, because Charisma Cruz chose to participate in the [address] [place] [address], all Medicare beneficiaries who meet the eligibility criteria of this initiative will receive care under the initiative. If you do not wish to receive care under the Bundled Payments [facility], you must choose a health care provider that does not participate in this initiative for your care. Regardless of which health care provider you see, Medicare will continue to cover all of your medically necessary services. Bundled Payments for Care Improvement Advanced aims to help improve your care     The Bundled Payments [facility] is an innovative Medicare initiative that encourages your doctors, hospitals, and other health care providers to work more closely together so you get better care during and following certain hospital stays.  In this initiative, doctors and hospitals may work closely with certain health care providers and suppliers that help patients recover after discharge from the hospital,

## 2019-09-01 NOTE — ED PROVIDER NOTES
EMERGENCY DEPARTMENT ENCOUNTER    Pt Name: Tr Gupta  MRN: [id number]  [place] 1950  Date of evaluation: 9/1/19  CHIEF COMPLAINT       Chief Complaint   Patient presents with    Hand Injury    Back Pain    Fall     HISTORY OF PRESENT ILLNESS   79-year-old morbidly obese male presents emergency department for bilateral lower back pain. Patient reports that over the last week or so he has had pain in the back that he feels like his back is spasming. Pain is bilateral in nature. He does not have any midline pain. He does not have any numbness or weakness in the lower extremities. He states that at times the back pain gets so severe that he gets cramping in his hands and cannot effectively  things. Patient also reports over the last few days decreased appetite. Today he felt his legs get weak and he fell twisting his ankle. He does not have any significant pain to the lower extremities. He denies any pain with range of motion of the ankles. He denies any abdominal pain or vomiting. REVIEW OF SYSTEMS     Review of Systems   Constitutional: Positive for appetite change. Musculoskeletal: Positive for back pain. All other systems reviewed and are negative.     PASTMEDICAL HISTORY     Past Medical History:   Diagnosis Date    Acute kidney failure ([address]     Arthritis     Atelectasis, left 08/10/2014    LEFT LOWER LOBE    Atrial fibrillation ([address]     9/2016    Blood circulation, collateral     martin filter for prior DVT in right leg, right arm    Cor pulmonale ([address] 08/02/2014    DVT (deep vein thrombosis) in pregnancy ([address] 07/30/2014    Dyspnea 07/29/2014    Elevated brain natriuretic peptide (BNP) level 07/29/2014    Martin filter in place     leg - groin    Gross hematuria 0805/2014    History of blood clots     Hx of blood clots     left leg, right arm    Hypertension     Kidney disease     is currently being worked up for kidney disease    Kidney

## 2019-09-02 PROBLEM — G89.29 CHRONIC ABDOMINAL PAIN: Status: ACTIVE | Noted: 2019-09-02

## 2019-09-02 PROBLEM — R10.9 CHRONIC ABDOMINAL PAIN: Status: ACTIVE | Noted: 2019-09-02

## 2019-09-02 LAB
-: ABNORMAL
ABSOLUTE EOS #: 0 K/UL (ref 0–0.4)
ABSOLUTE IMMATURE GRANULOCYTE: 0 K/UL (ref 0–0.3)
ABSOLUTE LYMPH #: 1.04 K/UL (ref 1–4.8)
ABSOLUTE MONO #: 0.73 K/UL (ref 0.2–0.8)
ALBUMIN SERPL-MCNC: 2.9 G/DL (ref 3.5–5.2)
ALBUMIN/GLOBULIN RATIO: ABNORMAL (ref 1–2.5)
ALP BLD-CCNC: 86 U/L (ref 40–129)
ALT SERPL-CCNC: 51 U/L (ref 5–41)
AMORPHOUS: ABNORMAL
ANION GAP SERPL CALCULATED.3IONS-SCNC: 15 MMOL/L (ref 9–17)
AST SERPL-CCNC: 88 U/L
ATYPICAL LYMPHOCYTE ABSOLUTE COUNT: 0.31 K/UL
ATYPICAL LYMPHOCYTES: 6 %
BACTERIA: ABNORMAL
BASOPHILS # BLD: 0 %
BASOPHILS ABSOLUTE: 0 K/UL (ref 0–0.2)
BILIRUB SERPL-MCNC: 0.57 MG/DL (ref 0.3–1.2)
BILIRUBIN URINE: ABNORMAL
BUN BLDV-MCNC: 45 MG/DL (ref 8–23)
BUN/CREAT BLD: 10 (ref 9–20)
CALCIUM SERPL-MCNC: 8 MG/DL (ref 8.6–10.4)
CASTS UA: ABNORMAL /LPF
CHLORIDE BLD-SCNC: 95 MMOL/L (ref 98–107)
CO2: 21 MMOL/L (ref 20–31)
COLOR: YELLOW
COMMENT UA: ABNORMAL
CREAT SERPL-MCNC: 4.5 MG/DL (ref 0.7–1.2)
CREATININE URINE: 588 MG/DL (ref 39–259)
CREATININE URINE: 603 MG/DL (ref 39–259)
CRYSTALS, UA: ABNORMAL /HPF
DIFFERENTIAL TYPE: ABNORMAL
EOSINOPHIL,URINE: NORMAL
EOSINOPHILS RELATIVE PERCENT: 0 % (ref 1–4)
EPITHELIAL CELLS UA: ABNORMAL /HPF (ref 0–5)
FERRITIN: 1729 UG/L (ref 30–400)
FOLATE: 7.8 NG/ML
GFR AFRICAN AMERICAN: 16 ML/MIN
GFR NON-AFRICAN AMERICAN: 13 ML/MIN
GFR SERPL CREATININE-BSD FRML MDRD: ABNORMAL ML/MIN/{1.73_M2}
GFR SERPL CREATININE-BSD FRML MDRD: ABNORMAL ML/MIN/{1.73_M2}
GLUCOSE BLD-MCNC: 125 MG/DL (ref 70–99)
GLUCOSE URINE: NEGATIVE
HCT VFR BLD CALC: 38.4 % (ref 40.7–50.3)
HEMOGLOBIN: 12.2 G/DL (ref 13–17)
IMMATURE GRANULOCYTES: 0 %
INR BLD: 2.4
IRON: 24 UG/DL (ref 59–158)
KETONES, URINE: NEGATIVE
LACTIC ACID: 1.2 MMOL/L (ref 0.5–2.2)
LEUKOCYTE ESTERASE, URINE: NEGATIVE
LYMPHOCYTES # BLD: 20 % (ref 24–44)
MCH RBC QN AUTO: 27.8 PG (ref 25.2–33.5)
MCHC RBC AUTO-ENTMCNC: 31.8 G/DL (ref 28.4–34.8)
MCV RBC AUTO: 87.5 FL (ref 82.6–102.9)
MICROALBUMIN/CREAT 24H UR: 99 MG/L
MICROALBUMIN/CREAT UR-RTO: 16 MCG/MG CREAT
MONOCYTES # BLD: 14 % (ref 1–7)
MORPHOLOGY: ABNORMAL
MUCUS: ABNORMAL
NITRITE, URINE: NEGATIVE
NRBC AUTOMATED: 0 PER 100 WBC
OTHER OBSERVATIONS UA: ABNORMAL
PDW BLD-RTO: 15.2 % (ref 11.8–14.4)
PH UA: 5 (ref 5–8)
PHOSPHORUS: 3.3 MG/DL (ref 2.5–4.5)
PLATELET # BLD: 117 K/UL (ref 138–453)
PLATELET ESTIMATE: ABNORMAL
PMV BLD AUTO: 11.3 FL (ref 8.1–13.5)
POTASSIUM SERPL-SCNC: 4.2 MMOL/L (ref 3.7–5.3)
PROTEIN UA: ABNORMAL
PROTHROMBIN TIME: 23.4 SEC (ref 9.7–11.6)
PTH INTACT: 80.2 PG/ML (ref 15–65)
RBC # BLD: 4.39 M/UL (ref 4.21–5.77)
RBC # BLD: ABNORMAL 10*6/UL
RBC UA: ABNORMAL /HPF (ref 0–2)
RENAL EPITHELIAL, UA: ABNORMAL /HPF
SEG NEUTROPHILS: 60 % (ref 36–66)
SEGMENTED NEUTROPHILS ABSOLUTE COUNT: 3.12 K/UL (ref 1.8–7.7)
SODIUM BLD-SCNC: 131 MMOL/L (ref 135–144)
SODIUM,UR: <20 MMOL/L
SPECIFIC GRAVITY UA: 1.02 (ref 1–1.03)
TOTAL CK: 683 U/L (ref 39–308)
TOTAL PROTEIN: 6.7 G/DL (ref 6.4–8.3)
TRICHOMONAS: ABNORMAL
TURBIDITY: ABNORMAL
URINE HGB: NEGATIVE
UROBILINOGEN, URINE: NORMAL
VITAMIN B-12: 403 PG/ML (ref 232–1245)
VITAMIN D 25-HYDROXY: 20.1 NG/ML (ref 30–100)
WBC # BLD: 5.2 K/UL (ref 3.5–11.3)
WBC # BLD: ABNORMAL 10*3/UL
WBC UA: ABNORMAL /HPF (ref 0–5)
YEAST: ABNORMAL

## 2019-09-02 PROCEDURE — 87205 SMEAR GRAM STAIN: CPT

## 2019-09-02 PROCEDURE — 84156 ASSAY OF PROTEIN URINE: CPT

## 2019-09-02 PROCEDURE — 83540 ASSAY OF IRON: CPT

## 2019-09-02 PROCEDURE — 82607 VITAMIN B-12: CPT

## 2019-09-02 PROCEDURE — 87150 DNA/RNA AMPLIFIED PROBE: CPT

## 2019-09-02 PROCEDURE — 86038 ANTINUCLEAR ANTIBODIES: CPT

## 2019-09-02 PROCEDURE — 82306 VITAMIN D 25 HYDROXY: CPT

## 2019-09-02 PROCEDURE — 84165 PROTEIN E-PHORESIS SERUM: CPT

## 2019-09-02 PROCEDURE — 82043 UR ALBUMIN QUANTITATIVE: CPT

## 2019-09-02 PROCEDURE — 6370000000 HC RX 637 (ALT 250 FOR IP): Performed by: INTERNAL MEDICINE

## 2019-09-02 PROCEDURE — 82746 ASSAY OF FOLIC ACID SERUM: CPT

## 2019-09-02 PROCEDURE — 84100 ASSAY OF PHOSPHORUS: CPT

## 2019-09-02 PROCEDURE — 84300 ASSAY OF URINE SODIUM: CPT

## 2019-09-02 PROCEDURE — 83970 ASSAY OF PARATHORMONE: CPT

## 2019-09-02 PROCEDURE — 6360000002 HC RX W HCPCS: Performed by: INTERNAL MEDICINE

## 2019-09-02 PROCEDURE — 80053 COMPREHEN METABOLIC PANEL: CPT

## 2019-09-02 PROCEDURE — 85025 COMPLETE CBC W/AUTO DIFF WBC: CPT

## 2019-09-02 PROCEDURE — 86335 IMMUNFIX E-PHORSIS/URINE/CSF: CPT

## 2019-09-02 PROCEDURE — 81001 URINALYSIS AUTO W/SCOPE: CPT

## 2019-09-02 PROCEDURE — 82550 ASSAY OF CK (CPK): CPT

## 2019-09-02 PROCEDURE — 1200000000 HC SEMI PRIVATE

## 2019-09-02 PROCEDURE — 2580000003 HC RX 258: Performed by: INTERNAL MEDICINE

## 2019-09-02 PROCEDURE — 82570 ASSAY OF URINE CREATININE: CPT

## 2019-09-02 PROCEDURE — 83605 ASSAY OF LACTIC ACID: CPT

## 2019-09-02 PROCEDURE — 82728 ASSAY OF FERRITIN: CPT

## 2019-09-02 PROCEDURE — 87086 URINE CULTURE/COLONY COUNT: CPT

## 2019-09-02 PROCEDURE — 85610 PROTHROMBIN TIME: CPT

## 2019-09-02 PROCEDURE — 84166 PROTEIN E-PHORESIS/URINE/CSF: CPT

## 2019-09-02 PROCEDURE — 84155 ASSAY OF PROTEIN SERUM: CPT

## 2019-09-02 PROCEDURE — 36415 COLL VENOUS BLD VENIPUNCTURE: CPT

## 2019-09-02 PROCEDURE — 87040 BLOOD CULTURE FOR BACTERIA: CPT

## 2019-09-02 RX ORDER — HEPARIN SODIUM 5000 [USP'U]/ML
5000 INJECTION, SOLUTION INTRAVENOUS; SUBCUTANEOUS EVERY 8 HOURS SCHEDULED
Status: DISCONTINUED | OUTPATIENT
Start: 2019-09-02 | End: 2019-09-02

## 2019-09-02 RX ORDER — ACETAMINOPHEN 325 MG/1
650 TABLET ORAL EVERY 6 HOURS PRN
Status: DISCONTINUED | OUTPATIENT
Start: 2019-09-02 | End: 2019-09-05 | Stop reason: HOSPADM

## 2019-09-02 RX ORDER — WARFARIN SODIUM 10 MG/1
10 TABLET ORAL DAILY
Status: DISCONTINUED | OUTPATIENT
Start: 2019-09-02 | End: 2019-09-05

## 2019-09-02 RX ADMIN — SODIUM CHLORIDE: 9 INJECTION, SOLUTION INTRAVENOUS at 11:15

## 2019-09-02 RX ADMIN — ACETAMINOPHEN 650 MG: 325 TABLET ORAL at 15:52

## 2019-09-02 RX ADMIN — WARFARIN SODIUM 10 MG: 10 TABLET ORAL at 17:50

## 2019-09-02 RX ADMIN — CEFTRIAXONE SODIUM 1 G: 1 INJECTION, POWDER, FOR SOLUTION INTRAMUSCULAR; INTRAVENOUS at 15:52

## 2019-09-02 RX ADMIN — AMITRIPTYLINE HYDROCHLORIDE 50 MG: 50 TABLET, FILM COATED ORAL at 21:27

## 2019-09-02 ASSESSMENT — PAIN SCALES - GENERAL: PAINLEVEL_OUTOF10: 0

## 2019-09-02 NOTE — H&P
(BNP) level    Dyspnea on exertion    DVT (deep venous thrombosis) (HCC)    Acute respiratory failure with hypoxia (HCC)    Respiratory acidosis    Cor pulmonale (HCC)    Gross hematuria    Atelectasis of left lower lobe    Pneumonia    MRSA (methicillin resistant staph aureus) culture positive    STEPH (obstructive sleep apnea)    Oral bleeding    MRSA (methicillin resistant Staphylococcus aureus)    Morbid obesity with BMI of 60.0-69.9, adult (HCC)    Tibial DVT (deep venous thrombosis) (HCC)    Respiratory failure (HCC)    Toxic metabolic encephalopathy    Critical illness myopathy    Critical illness neuropathy (HCC)    Polyneuropathy    Persistent cough    Sacral decubitus ulcer    Arm vein blood clot    Ureteral calculus, right    Ureteral calculus, right    Hydronephrosis, right    Leukocytosis    Right kidney stone    Tachycardia    Elevated serum creatinine    Obstructed, uropathy    Atrial fibrillation with RVR (HCC)    Pulmonary hypertension (HCC)    Urinary tract infection with hematuria    Atypical atrial flutter (HCC)    Bandemia    C. difficile diarrhea    Bradycardia    Ureteral calculus, right    ZACH (acute kidney injury) [facility]       PLAN:    Admit to MedSurg, avoid nephrotoxic drugs we will check renal ultrasound IV fluids protein electrophoresis PTH phosphorus will have neurology consultation orders consultation          Stacy Aldridge MD  11:53 AM  9/2/2019

## 2019-09-02 NOTE — PLAN OF CARE
Problem: Pain:  Goal: Pain level will decrease  Description  Pain level will decrease  Outcome: Ongoing     Problem: Falls - Risk of:  Goal: Will remain free from falls  Description  Will remain free from falls  Outcome: Ongoing     Problem: Safety:  Goal: Free from intentional harm  Description  Free from intentional harm  Outcome: Ongoing     Problem: Daily Care:  Goal: Daily care needs are met  Description  Daily care needs are met  Outcome: Ongoing     Problem: Discharge Planning:  Goal: Patients continuum of care needs are met  Description  Patients continuum of care needs are met  Outcome: Ongoing

## 2019-09-02 NOTE — CONSULTS
Consult Note    Reason for Consult: ZACH    Requesting Physician:  Elliot Rowan MD    HISTORY OF PRESENT ILLNESS:    The patient is a 70 y.o. male who presents with c/o poor po intake x 10 days. He denies N/V/D. He states he \"just didn't feeling like eating\". Baseline CKD 3/4. Creatinine 3.63 yesterday and 4.5 today. He states baseline creatinine 2.1 during last visit with Dr. Demetrio Ortega.  today with bicarb 22. BUN trending 40s. Glucos ok. Phos level 3.3. Alb 2.9. AST/ALT mildly elevated. hgb 12.2. SBP trending 90-140s. Home meds include lisinopril and phoslo. Both have been held. He c/o decreased urine output. U/O today 100mL. He has been bladder scanned multiple times with minimal volumes. He denies recent NSAID use. He uses tylenol 3 at home. No recent rashes. He has hx of nephrolithiasis. He states he saw urology in the past several months with negative imaging. UA showed 10-20 casts with 0-2 RBC. He c/o weigh gain of 20lb in the past several months. CK level 843 yesterday. Review Of Systems:   Constitutional: No fever, chills, lethargy, weakness or wt loss  HEENT:  No headache, nasal discharge or sore throat. Cardiac:  No chest pain, dyspnea, orthopnea or PND. Chest:  No cough, phlegm or wheezing. Abdomen:  +anorexia. No abdominal pain, nausea, vomiting or diarrhea. Neuro:  No gross focal weakness, numbness, abnormal movements or seizure like activity. Skin:   No rashes or itching. :   No hematuria, pyuria, dysuria or flank pain. Extremities:  No swelling or joint pains. Endocrine: No polyuria, polydypsia, or thyroid problems. Hematology:    No bleeding disorders, bruising or anemia. All other ROS is negative.     Past Medical History:   Diagnosis Date    Acute kidney failure ([address]     Arthritis     Atelectasis, left 08/10/2014    LEFT LOWER LOBE    Atrial fibrillation ([address]     9/2016    Blood circulation, collateral     ismael filter for prior DVT in right leg, right arm    Cor pulmonale ([address] 08/02/2014    DVT (deep vein thrombosis) in pregnancy ([address] 07/30/2014    Dyspnea 07/29/2014    Elevated brain natriuretic peptide (BNP) level 07/29/2014    Ismael filter in place     leg - groin    Gross hematuria 0805/2014    History of blood clots     Hx of blood clots     left leg, right arm    Hypertension     Kidney disease     is currently being worked up for kidney disease    Kidney stone on right side     Morbid obesity ([address]     MRSA (methicillin resistant staph aureus) culture positive 8/11/2014    sputum    MRSA (methicillin resistant staph aureus) culture positive     Neuropathy     left foot    STEPH (obstructive sleep apnea)     PAF (paroxysmal atrial fibrillation) ([address] 09/22/2017    Pneumonia     Pneumonia     Polyneuropathy 8/23/2014    Pulmonary hypertension ([address] 09/22/2017    Respiratory acidosis 07/31/2014    Respiratory failure ([address] 2014    had trach placed then removed.  Res[facility] failure [facility]     Sleep apnea     CPAP       Past Surgical History:   Procedure Laterality Date    ABLATION OF DYSRHYTHMIC FOCUS  12/12/2016    CARDIAC CATHETERIZATION      CHOLECYSTECTOMY      COLONOSCOPY      CYSTOSCOPY Right 09/13/2016    right retrograde pyelogram , right urethral stent    CYSTOSCOPY  11/16/2016    with stent exchange of right ureter    ENDOSCOPY, COLON, DIAGNOSTIC      HC  PICC POWERPIC TRIPLE  08/18/2014    no longer present    LITHOTRIPSY  11/16/2016    Holium laser lithotripsy     TRACHEOSTOMY      closed     URETER STENT PLACEMENT Right     VASCULAR SURGERY      ismael filter implant       Prior to Admission medications    Medication Sig Start Date End Date Taking? Authorizing Provider   warfarin (COUMADIN) 10 MG tablet Take 1 tablet by mouth daily as directed by [address] [place] [address]. 8/13/19  Yes Merritt Potts MD   pregabalin (LYRICA) 25 MG capsule Take 25 mg by mouth 2 times daily. .   Yes meetings of clubs or organizations: Not on file     Relationship status: Not on file    Intimate partner violence:     Fear of current or ex partner: Not on file     Emotionally abused: Not on file     Physically abused: Not on file     Forced sexual activity: Not on file   Other Topics Concern    Not on file   Social History Narrative    Not on file       Family History   Problem Relation Age of Onset    Cancer Mother     Heart Disease Father          Physical Exam:  Vitals:    09/02/19 0357 09/02/19 0753 09/02/19 1124 09/02/19 1207   BP: 123/84 (!) 116/57 (!) 146/58    Pulse: 83 108 74    Resp: 16 16 20    Temp: 98.8 °F (37.1 °C) 98.2 °F (36.8 °C) 99.9 °F (37.7 °C)    TempSrc: Oral Oral Oral    SpO2: 93% 92% 97%    Weight:       Height:    5' 9\" (1.753 m)     I/O last 3 completed shifts: In: 657 [I.V.:657]  Out: -       General:  Awake, alert, not in distress. Appears to be stated age. HEENT: Atraumatic, normocephalic. Anicteric sclera. Pink and moist oral mucosa. Neck supple. No carotid bruit. No JVD. Chest: Bilateral air entry, clear to auscultation, no wheezing, rhonchi or rales. Dimisihed bilaterally. Cardiovascular: S1S2, no murmur, rub or gallop. + lower extremity edema. Abdomen: Soft, non tender to palpation. Active bowel sounds x 4 quadrants. Musculoskeletal: Active ROM x 4 extremities. No cyanosis or clubbing. Integumentary: Pink, warm and dry. Free from rash or lesions. Skin turgor normal.  CNS: Oriented to person, place and time. Cranial nerves grossly intact. Speech clear. Face symmetrical. No tremor.      Data:    CBC:   Lab Results   Component Value Date    WBC 5.2 09/02/2019    HGB 12.2 (L) 09/02/2019    HCT 38.4 (L) 09/02/2019    MCV 87.5 09/02/2019     (L) 09/02/2019     BMP:    Lab Results   Component Value Date     (L) 09/02/2019     (L) 09/01/2019     12/13/2016    K 4.2 09/02/2019    K 4.3 09/01/2019    K 4.6 12/13/2016    CL 95 (L) 09/02/2019    CL 94

## 2019-09-02 NOTE — CONSULTS
anticoagulants: None    Plan: Continue home regimen on 10mg daily. Daily PT/INR is ordered while inpatient. Thank you for the consult. Will continue to follow.   Bert Clay  9/2/2019  5:27 PM

## 2019-09-03 ENCOUNTER — APPOINTMENT (OUTPATIENT)
Dept: ULTRASOUND IMAGING | Age: 69
DRG: 683 | End: 2019-09-03
Payer: MEDICARE

## 2019-09-03 ENCOUNTER — TELEPHONE (OUTPATIENT)
Dept: PHARMACY | Age: 69
End: 2019-09-03

## 2019-09-03 DIAGNOSIS — I82.499 DEEP VEIN THROMBOSIS (DVT) OF OTHER VEIN OF LOWER EXTREMITY, UNSPECIFIED CHRONICITY, UNSPECIFIED LATERALITY (HCC): ICD-10-CM

## 2019-09-03 LAB
ABSOLUTE EOS #: 0 K/UL (ref 0–0.4)
ABSOLUTE IMMATURE GRANULOCYTE: 0 K/UL (ref 0–0.3)
ABSOLUTE LYMPH #: 1.67 K/UL (ref 1–4.8)
ABSOLUTE MONO #: 0.56 K/UL (ref 0.2–0.8)
ALBUMIN (CALCULATED): 3.2 G/DL (ref 3.2–5.2)
ALBUMIN PERCENT: 52 % (ref 45–65)
ALPHA 1 PERCENT: 3 % (ref 3–6)
ALPHA 2 PERCENT: 11 % (ref 6–13)
ALPHA-1-GLOBULIN: 0.2 G/DL (ref 0.1–0.4)
ALPHA-2-GLOBULIN: 0.7 G/DL (ref 0.5–0.9)
ANION GAP SERPL CALCULATED.3IONS-SCNC: 12 MMOL/L (ref 9–17)
ANTI-NUCLEAR ANTIBODY (ANA): NEGATIVE
ATYPICAL LYMPHOCYTE ABSOLUTE COUNT: 0.19 K/UL
ATYPICAL LYMPHOCYTES: 3 %
BASOPHILS # BLD: 1 %
BASOPHILS ABSOLUTE: 0.06 K/UL (ref 0–0.2)
BETA GLOBULIN: 0.9 G/DL (ref 0.5–1.1)
BETA PERCENT: 14 % (ref 11–19)
BUN BLDV-MCNC: 51 MG/DL (ref 8–23)
BUN/CREAT BLD: 11 (ref 9–20)
CALCIUM SERPL-MCNC: 7.9 MG/DL (ref 8.6–10.4)
CHLORIDE BLD-SCNC: 98 MMOL/L (ref 98–107)
CO2: 21 MMOL/L (ref 20–31)
CREAT SERPL-MCNC: 4.56 MG/DL (ref 0.7–1.2)
DIFFERENTIAL TYPE: ABNORMAL
EOSINOPHILS RELATIVE PERCENT: 0 % (ref 1–4)
GAMMA GLOBULIN %: 21 % (ref 9–20)
GAMMA GLOBULIN: 1.3 G/DL (ref 0.5–1.5)
GFR AFRICAN AMERICAN: 16 ML/MIN
GFR NON-AFRICAN AMERICAN: 13 ML/MIN
GFR SERPL CREATININE-BSD FRML MDRD: ABNORMAL ML/MIN/{1.73_M2}
GFR SERPL CREATININE-BSD FRML MDRD: ABNORMAL ML/MIN/{1.73_M2}
GLUCOSE BLD-MCNC: 113 MG/DL (ref 70–99)
HCT VFR BLD CALC: 37.2 % (ref 40.7–50.3)
HEMOGLOBIN: 12.1 G/DL (ref 13–17)
IMMATURE GRANULOCYTES: 0 %
INR BLD: 1.8
LACTIC ACID: 1.1 MMOL/L (ref 0.5–2.2)
LYMPHOCYTES # BLD: 27 % (ref 24–44)
MAGNESIUM: 1.8 MG/DL (ref 1.6–2.6)
MCH RBC QN AUTO: 28.1 PG (ref 25.2–33.5)
MCHC RBC AUTO-ENTMCNC: 32.5 G/DL (ref 28.4–34.8)
MCV RBC AUTO: 86.3 FL (ref 82.6–102.9)
MONOCYTES # BLD: 9 % (ref 1–7)
MORPHOLOGY: ABNORMAL
NRBC AUTOMATED: 0 PER 100 WBC
PATHOLOGIST: ABNORMAL
PDW BLD-RTO: 15.3 % (ref 11.8–14.4)
PHOSPHORUS: 3.9 MG/DL (ref 2.5–4.5)
PLATELET # BLD: 123 K/UL (ref 138–453)
PLATELET ESTIMATE: ABNORMAL
PMV BLD AUTO: 10.6 FL (ref 8.1–13.5)
POTASSIUM SERPL-SCNC: 4.7 MMOL/L (ref 3.7–5.3)
PROTEIN ELECTROPHORESIS, SERUM: ABNORMAL
PROTHROMBIN TIME: 18.3 SEC (ref 9.7–11.6)
RBC # BLD: 4.31 M/UL (ref 4.21–5.77)
RBC # BLD: ABNORMAL 10*6/UL
SEG NEUTROPHILS: 60 % (ref 36–66)
SEGMENTED NEUTROPHILS ABSOLUTE COUNT: 3.72 K/UL (ref 1.8–7.7)
SODIUM BLD-SCNC: 131 MMOL/L (ref 135–144)
TOTAL PROT. SUM,%: 101 % (ref 98–102)
TOTAL PROT. SUM: 6.3 G/DL (ref 6.3–8.2)
TOTAL PROTEIN: 6.2 G/DL (ref 6.4–8.3)
WBC # BLD: 6.2 K/UL (ref 3.5–11.3)
WBC # BLD: ABNORMAL 10*3/UL

## 2019-09-03 PROCEDURE — 36415 COLL VENOUS BLD VENIPUNCTURE: CPT

## 2019-09-03 PROCEDURE — 83605 ASSAY OF LACTIC ACID: CPT

## 2019-09-03 PROCEDURE — 85025 COMPLETE CBC W/AUTO DIFF WBC: CPT

## 2019-09-03 PROCEDURE — 84100 ASSAY OF PHOSPHORUS: CPT

## 2019-09-03 PROCEDURE — 2580000003 HC RX 258: Performed by: INTERNAL MEDICINE

## 2019-09-03 PROCEDURE — 1200000000 HC SEMI PRIVATE

## 2019-09-03 PROCEDURE — 80048 BASIC METABOLIC PNL TOTAL CA: CPT

## 2019-09-03 PROCEDURE — 97535 SELF CARE MNGMENT TRAINING: CPT

## 2019-09-03 PROCEDURE — 76770 US EXAM ABDO BACK WALL COMP: CPT

## 2019-09-03 PROCEDURE — 83735 ASSAY OF MAGNESIUM: CPT

## 2019-09-03 PROCEDURE — 2700000000 HC OXYGEN THERAPY PER DAY

## 2019-09-03 PROCEDURE — 97166 OT EVAL MOD COMPLEX 45 MIN: CPT

## 2019-09-03 PROCEDURE — 97162 PT EVAL MOD COMPLEX 30 MIN: CPT

## 2019-09-03 PROCEDURE — 97530 THERAPEUTIC ACTIVITIES: CPT

## 2019-09-03 PROCEDURE — 6370000000 HC RX 637 (ALT 250 FOR IP): Performed by: INTERNAL MEDICINE

## 2019-09-03 PROCEDURE — 85610 PROTHROMBIN TIME: CPT

## 2019-09-03 PROCEDURE — 6360000002 HC RX W HCPCS: Performed by: INTERNAL MEDICINE

## 2019-09-03 PROCEDURE — 87040 BLOOD CULTURE FOR BACTERIA: CPT

## 2019-09-03 RX ADMIN — SODIUM CHLORIDE: 9 INJECTION, SOLUTION INTRAVENOUS at 15:18

## 2019-09-03 RX ADMIN — SODIUM CHLORIDE: 9 INJECTION, SOLUTION INTRAVENOUS at 01:03

## 2019-09-03 RX ADMIN — WARFARIN SODIUM 10 MG: 10 TABLET ORAL at 20:04

## 2019-09-03 RX ADMIN — AMITRIPTYLINE HYDROCHLORIDE 50 MG: 50 TABLET, FILM COATED ORAL at 21:32

## 2019-09-03 RX ADMIN — CEFTRIAXONE SODIUM 1 G: 1 INJECTION, POWDER, FOR SOLUTION INTRAMUSCULAR; INTRAVENOUS at 16:11

## 2019-09-03 ASSESSMENT — PAIN DESCRIPTION - LOCATION
LOCATION: BACK
LOCATION: BACK;KNEE

## 2019-09-03 ASSESSMENT — PAIN DESCRIPTION - ONSET: ONSET: GRADUAL

## 2019-09-03 ASSESSMENT — PAIN DESCRIPTION - PAIN TYPE
TYPE: ACUTE PAIN
TYPE: ACUTE PAIN

## 2019-09-03 ASSESSMENT — PAIN DESCRIPTION - ORIENTATION
ORIENTATION: OTHER (COMMENT)
ORIENTATION: LEFT

## 2019-09-03 ASSESSMENT — PAIN SCALES - GENERAL
PAINLEVEL_OUTOF10: 3
PAINLEVEL_OUTOF10: 3
PAINLEVEL_OUTOF10: 0

## 2019-09-03 ASSESSMENT — PAIN DESCRIPTION - FREQUENCY
FREQUENCY: INTERMITTENT
FREQUENCY: INTERMITTENT

## 2019-09-03 ASSESSMENT — PAIN DESCRIPTION - DESCRIPTORS
DESCRIPTORS: ACHING
DESCRIPTORS: ACHING

## 2019-09-03 ASSESSMENT — PAIN DESCRIPTION - PROGRESSION: CLINICAL_PROGRESSION: GRADUALLY WORSENING

## 2019-09-03 NOTE — PROGRESS NOTES
(BNP) level, Martin filter in place, Gross hematuria, History of blood clots, Hx of blood clots, Hypertension, Kidney disease, Kidney stone on right side, Morbid obesity ([address] MRSA (methicillin resistant staph aureus) culture positive, MRSA (methicillin resistant staph aureus) culture positive, Neuropathy, STEPH (obstructive sleep apnea), PAF (paroxysmal atrial fibrillation) ([address] Pneumonia, Pneumonia, Polyneuropathy, Pulmonary hypertension ([address] Respiratory acidosis, Respiratory failure ([address] Respiratory failure ([address] and Sleep apnea. has a past surgical history that includes Cholecystectomy; hc  picc powerpic triple (08/18/2014); vascular surgery; Cystocopy (Right, 09/13/2016); Ureter stent placement (Right); Cystoscopy (11/16/2016); Lithotripsy (11/16/2016); Cardiac catheterization; tracheostomy; Colonoscopy; Endoscopy, colon, diagnostic; and ablation of dysrhythmic focus (12/12/2016). Restrictions  Restrictions/Precautions  Restrictions/Precautions: Up as Tolerated, General Precautions  Position Activity Restriction  Other position/activity restrictions: 500 lbs; bariatric pressure adjusted bed    Subjective   General  Chart Reviewed: Yes  Patient assessed for rehabilitation services?: Yes  Family / Caregiver Present: No  General Comment  Comments: Writer consulted Ryan Laws RN whom indicated pt. was medically stable for therapy this date.   Patient Currently in Pain: Yes  Pain Assessment  Pain Assessment: 0-10  Pain Level: 3  Patient's Stated Pain Goal: No pain  Pain Type: Acute pain  Pain Location: Back  Pain Orientation: Other (Comment)(generalized)  Pain Descriptors: Aching  Pain Frequency: Intermittent    Social/Functional History  Social/Functional History  Lives With: Spouse  Type of Home: House  Home Layout: One level  Home Access: Stairs to enter with rails  Entrance Stairs - Number of Steps: 2  Entrance Stairs - Rails: Both  Bathroom Shower/Tub: Tub/Shower unit  Bathroom Toilet: Handicap height  Bathroom Equipment: Grab bars in shower, Tub transfer bench  Bathroom Accessibility: Accessible  Home Equipment: [employer], 4 wheeled walker, Quad cane(hasn't used wc since 2015; has a pressure relieving bed)  ADL Assistance: [address]: Independent  Homemaking Responsibilities: Yes(meal prep)  Ambulation Assistance: Independent  Transfer Assistance: Independent  Active : Yes  Occupation: Retired, Part time employment(currently does 5 newspaper routes for extra money)  Type of occupation:        Objective   Hearing: Within functional limits    Orientation  Overall Orientation Status: Within Functional Limits  Observation/Palpation  Observation: pt motivated. Pt has been very independent and appears surprised on how weak he already is. Balance  Sitting Balance: Stand by assistance  Standing Balance: Minimal assistance  Standing Balance  Time: ~2 min, x2  Activity: static stand, lateral steps to [facility]  Comment: pt. took 4 steps with RW and min A  Functional Mobility  Functional - Mobility Device: Rolling Walker(bariatric)  Toilet Transfers  Toilet Transfers Comments: pt. requested urinal lying down  ADL  Grooming: Setup  UE Dressing: Minimal assistance  LE Dressing: Maximum assistance  Toileting: Dependent/Total(with urinal)  Additional Comments: pt. declined to wash up today stating \"maybe tomorrow. \"  Tone RUE  RUE Tone: Normotonic  Tone LUE  LUE Tone: Normotonic  Coordination  Movements Are Fluid And Coordinated: Yes     Bed mobility  Bridging: Modified independent   Rolling to Left: Modified independent  Rolling to Right: Minimal assistance  Supine to Sit: Minimal assistance  Sit to Supine: Minimal assistance  Scooting: Moderate assistance  Comment: pt. required use of bedrails for all bed mobility, pt. struggles to achieve movement and requires extra time.   Transfers  Stand Step Transfers: Minimal assistance(lateral steps to [facility])  Sit to stand: Minimal

## 2019-09-03 NOTE — PROGRESS NOTES
Physical Therapy    Facility/Department: [facility]  Initial Assessment    NAME: Bernie Ware  : 1950  MRN: [id number]    Date of Service: 9/3/2019    Discharge Recommendations:  Continue to assess pending progress ; pain and O2 sats limiting function at this time. Assessment   Body structures, Functions, Activity limitations: Decreased functional mobility ; Decreased strength;Decreased endurance;Decreased safe awareness;Decreased ROM; Decreased balance  Assessment: Pt presents with significant deconditioning and pain limiting normal independent function. Pt reports he typically is a community ambulator and only since the fall has been relying on a cane. Currently, patient is desating w/ very light activity and is very apprehensive w/ his Lt LE able to support him. At 500 lbs, pt is at high risk for increase hospitalization complications. Pt encouraged for up to chair but declined at this time. [facility] called to assist w/ finding chair to fit patient. Pt strongly encouraged for OOB today as this is day two of him being in bed and first time up being this pm.  Will continue to assess for safest d/c need. Specific instructions for Next Treatment: confirm up to chair/ OOB. Increase walking w/ O2. Prognosis: Good  Decision Making: Medium Complexity  Clinical Presentation: evolving  PT Education: Functional Mobility Training; Adaptive Device Training;Transfer Training;Plan of Care;Gait Training;General Safety;Home Exercise Program  REQUIRES PT FOLLOW UP: Yes  Activity Tolerance  Activity Tolerance: Patient limited by endurance; Patient limited by pain       Patient Diagnosis(es): The encounter diagnosis was AZCH (acute kidney injury) ([address]      has a past medical history of Acute kidney failure ([address] Arthritis, Atelectasis, left, Atrial fibrillation ([address] Blood circulation, collateral, Cor pulmonale ([address] DVT (deep vein thrombosis) in pregnancy ([address] Dyspnea, Elevated brain natriuretic peptide (BNP) level, Martin filter in place, Gross hematuria, History of blood clots, Hx of blood clots, Hypertension, Kidney disease, Kidney stone on right side, Morbid obesity ([address] MRSA (methicillin resistant staph aureus) culture positive, MRSA (methicillin resistant staph aureus) culture positive, Neuropathy, STEPH (obstructive sleep apnea), PAF (paroxysmal atrial fibrillation) ([address] Pneumonia, Pneumonia, Polyneuropathy, Pulmonary hypertension ([address] Respiratory acidosis, Respiratory failure ([address] Respiratory failure ([address] and Sleep apnea. has a past surgical history that includes Cholecystectomy; hc  picc powerpic triple (08/18/2014); vascular surgery; Cystocopy (Right, 09/13/2016); Ureter stent placement (Right); Cystoscopy (11/16/2016); Lithotripsy (11/16/2016); Cardiac catheterization; tracheostomy; Colonoscopy; Endoscopy, colon, diagnostic; and ablation of dysrhythmic focus (12/12/2016).     Restrictions  Restrictions/Precautions  Restrictions/Precautions: Up as Tolerated, General Precautions  Position Activity Restriction  Other position/activity restrictions: 500 lbs; bariatric pressure adjusted bed  Vision/Hearing        Subjective  General  Chart Reviewed: Yes  Patient assessed for rehabilitation services?: Yes  Response To Previous Treatment: Not applicable  Family / Caregiver Present: No  Follows Commands: Within Functional Limits  Pain Screening  Patient Currently in Pain: Yes  Pain Assessment  Pain Level: 3  Pain Type: Acute pain  Pain Location: Back  Vital Signs  BP: 126/72  Patient Currently in Pain: Yes  Pre Treatment Pain Screening  Intervention List: Patient able to continue with treatment  Comments / Details: back pain and Lt leg pain NEW since fall ~ 1.2 weeks ago    Orientation  Orientation  Overall Orientation Status: Within Functional Limits  Social/Functional History  Social/Functional History  Lives With: Spouse  Type of Home: House  Home Layout: One level  Home Access: Stairs to

## 2019-09-04 LAB
ALBUMIN SERPL-MCNC: 2.9 G/DL (ref 3.5–5.2)
ALBUMIN/GLOBULIN RATIO: ABNORMAL (ref 1–2.5)
ALP BLD-CCNC: 104 U/L (ref 40–129)
ALT SERPL-CCNC: 51 U/L (ref 5–41)
ANION GAP SERPL CALCULATED.3IONS-SCNC: 11 MMOL/L (ref 9–17)
AST SERPL-CCNC: 67 U/L
BILIRUB SERPL-MCNC: 0.32 MG/DL (ref 0.3–1.2)
BUN BLDV-MCNC: 40 MG/DL (ref 8–23)
BUN/CREAT BLD: 16 (ref 9–20)
CALCIUM SERPL-MCNC: 8.4 MG/DL (ref 8.6–10.4)
CHLORIDE BLD-SCNC: 99 MMOL/L (ref 98–107)
CO2: 21 MMOL/L (ref 20–31)
CREAT SERPL-MCNC: 2.5 MG/DL (ref 0.7–1.2)
CULTURE: ABNORMAL
GFR AFRICAN AMERICAN: 31 ML/MIN
GFR NON-AFRICAN AMERICAN: 26 ML/MIN
GFR SERPL CREATININE-BSD FRML MDRD: ABNORMAL ML/MIN/{1.73_M2}
GFR SERPL CREATININE-BSD FRML MDRD: ABNORMAL ML/MIN/{1.73_M2}
GLUCOSE BLD-MCNC: 120 MG/DL (ref 70–99)
INR BLD: 2.1
Lab: ABNORMAL
MAGNESIUM: 1.8 MG/DL (ref 1.6–2.6)
P E INTERPRETATION, U: NORMAL
PATHOLOGIST: NORMAL
PHOSPHORUS: 3.4 MG/DL (ref 2.5–4.5)
POTASSIUM SERPL-SCNC: 4.7 MMOL/L (ref 3.7–5.3)
PROTHROMBIN TIME: 20.8 SEC (ref 9.7–11.6)
SODIUM BLD-SCNC: 131 MMOL/L (ref 135–144)
SPECIMEN DESCRIPTION: ABNORMAL
SPECIMEN TYPE: NORMAL
TOTAL PROTEIN: 7 G/DL (ref 6.4–8.3)
URINE IFX INTERP: NORMAL
URINE IFX SPECIMEN: NORMAL
URINE TOTAL PROTEIN: 77 MG/DL
URINE TOTAL PROTEIN: 77 MG/DL
VOLUME: NORMAL ML

## 2019-09-04 PROCEDURE — 6360000002 HC RX W HCPCS: Performed by: INTERNAL MEDICINE

## 2019-09-04 PROCEDURE — 97530 THERAPEUTIC ACTIVITIES: CPT

## 2019-09-04 PROCEDURE — 36415 COLL VENOUS BLD VENIPUNCTURE: CPT

## 2019-09-04 PROCEDURE — 1200000000 HC SEMI PRIVATE

## 2019-09-04 PROCEDURE — 6370000000 HC RX 637 (ALT 250 FOR IP): Performed by: INTERNAL MEDICINE

## 2019-09-04 PROCEDURE — 2580000003 HC RX 258: Performed by: INTERNAL MEDICINE

## 2019-09-04 PROCEDURE — 97535 SELF CARE MNGMENT TRAINING: CPT

## 2019-09-04 PROCEDURE — 84100 ASSAY OF PHOSPHORUS: CPT

## 2019-09-04 PROCEDURE — 80053 COMPREHEN METABOLIC PANEL: CPT

## 2019-09-04 PROCEDURE — 83735 ASSAY OF MAGNESIUM: CPT

## 2019-09-04 PROCEDURE — 85610 PROTHROMBIN TIME: CPT

## 2019-09-04 RX ORDER — LANOLIN ALCOHOL/MO/W.PET/CERES
3 CREAM (GRAM) TOPICAL NIGHTLY PRN
Status: DISCONTINUED | OUTPATIENT
Start: 2019-09-04 | End: 2019-09-05 | Stop reason: HOSPADM

## 2019-09-04 RX ADMIN — ONDANSETRON 4 MG: 4 TABLET, ORALLY DISINTEGRATING ORAL at 17:12

## 2019-09-04 RX ADMIN — Medication 10 ML: at 03:30

## 2019-09-04 RX ADMIN — AMITRIPTYLINE HYDROCHLORIDE 50 MG: 50 TABLET, FILM COATED ORAL at 20:33

## 2019-09-04 RX ADMIN — SODIUM CHLORIDE: 9 INJECTION, SOLUTION INTRAVENOUS at 03:30

## 2019-09-04 RX ADMIN — CEFTRIAXONE SODIUM 1 G: 1 INJECTION, POWDER, FOR SOLUTION INTRAMUSCULAR; INTRAVENOUS at 16:12

## 2019-09-04 RX ADMIN — MELATONIN TAB 3 MG 3 MG: 3 TAB at 22:56

## 2019-09-04 RX ADMIN — WARFARIN SODIUM 10 MG: 10 TABLET ORAL at 17:12

## 2019-09-04 ASSESSMENT — PAIN DESCRIPTION - ORIENTATION: ORIENTATION: LEFT

## 2019-09-04 ASSESSMENT — PAIN SCALES - GENERAL
PAINLEVEL_OUTOF10: 0
PAINLEVEL_OUTOF10: 3

## 2019-09-04 ASSESSMENT — PAIN DESCRIPTION - LOCATION: LOCATION: KNEE

## 2019-09-04 NOTE — PROGRESS NOTES
Occupational Therapy  Facility/Department: [facility]  Daily Treatment Note  NAME: Abram Olszewski  : 1950  MRN: [id number]    RN reports patient is medically stable for therapy treatment this date. Chart reviewed prior to treatment and patient is agreeable for therapy. All lines intact and patient positioned comfortably at end of treatment. All patient needs addressed prior to ending therapy session. Date of Service: 2019    Discharge Recommendations:  Home with assist PRN, Home with Home health OT  OT Equipment Recommendations  Equipment Needed: Yes  Mobility Devices: ADL Assistive Devices  ADL Assistive Devices: Reacher;Long-handled [place];Long-handled Sponge;Emergency Alert System; Toileting - 3-in-1 Commode    Assessment   Performance deficits / Impairments: Decreased functional mobility ; Decreased ADL status; Decreased endurance;Decreased high-level IADLs;Decreased balance  Prognosis: Fair  OT Education: OT Role;Transfer Training;Plan of Care  Patient Education: pursed lip breathing, safety in function, benefits of being up OOB, call light use/fall prevention, edema mgt tech, DC recommendations   REQUIRES OT FOLLOW UP: Yes  Activity Tolerance  Activity Tolerance: Patient limited by fatigue(limited by SOB/O2 per NC at all times during session )  Activity Tolerance: poor plus   Safety Devices  Type of devices: Call light within reach; Left in chair;Patient at risk for falls;Gait belt;Nurse notified(BLE's elevated and pillow placed under in recliner )         Patient Diagnosis(es): The encounter diagnosis was ZACH (acute kidney injury) ([address]       has a past medical history of Acute kidney failure ([address] Arthritis, Atelectasis, left, Atrial fibrillation ([address] Blood circulation, collateral, Cor pulmonale ([address] DVT (deep vein thrombosis) in pregnancy ([address] Dyspnea, Elevated brain natriuretic peptide (BNP) level, Martin filter in place, Gross hematuria, History of blood clots, Hx of blood clots, Hypertension, Kidney disease, Kidney stone on right side, Morbid obesity ([address] MRSA (methicillin resistant staph aureus) culture positive, MRSA (methicillin resistant staph aureus) culture positive, Neuropathy, STEPH (obstructive sleep apnea), PAF (paroxysmal atrial fibrillation) ([address] Pneumonia, Pneumonia, Polyneuropathy, Pulmonary hypertension ([address] Respiratory acidosis, Respiratory failure ([address] Respiratory failure ([address] and Sleep apnea. has a past surgical history that includes Cholecystectomy;   picc powerpic triple (08/18/2014); vascular surgery; Cystocopy (Right, 09/13/2016); Ureter stent placement (Right); Cystoscopy (11/16/2016); Lithotripsy (11/16/2016); Cardiac catheterization; tracheostomy; Colonoscopy; Endoscopy, colon, diagnostic; and ablation of dysrhythmic focus (12/12/2016). Restrictions  Restrictions/Precautions  Restrictions/Precautions: Fall Risk, Contact Precautions  Position Activity Restriction  Other position/activity restrictions: 500 lbs; bariatric pressure adjusted bed, 2 L O2 per NC, RUE IV, 1200 ml fluid restruiction, up with assist  Subjective   General  Chart Reviewed: Yes  Patient assessed for rehabilitation services?: Yes  Family / Caregiver Present: No  General Comment  Comments: Writer consulted Mason Green RN whom indicated pt. was medically stable for therapy this date. Pain Assessment  Pain Assessment: 0-10  Pain Level: 3  Pain Location: Knee  Pain Orientation: Left  Pre Treatment Pain Screening  Intervention List: Patient declined any intervention  Vital Signs  Patient Currently in Pain: Yes   Orientation  Orientation  Overall Orientation Status: Within Functional Limits  Objective    ADL  Additional Comments: Pt declined to complete any self care tasks with writer this date even after pt was edu on OT role/POC. Pt agreeable to getting up to recliner only. Pt was educated on benefits of AE use with self care to increase ease/I and safety.   Pt states he has a supportive spouse and will assist him as needed at TX. Balance  Sitting Balance: Stand by assistance  Standing Balance: Contact guard assistance(with RW )  Standing Balance  Time: < 30 seconds with RW for functional tasks   Functional Mobility  Functional - Mobility Device: Rolling Walker  Activity: (bed to recliner )  Functional Mobility Comments: verbal instruction for upright posture, pursed lip breathing, extending BUE's on AD for increased support, RW safety, slowing down movements with turns and awareness/assist with lines   Toilet Transfers  Toilet Transfers Comments: N/T and pt with no needs during session   Bed mobility  Supine to Sit: Minimal assistance(with increased time needed )  Sit to Supine: Unable to assess(pt agreed to sit up in recliner )  Comment: verbal instruction needed for pursed lip breathing and pt with good use of rail   Transfers  Stand Pivot Transfers: Contact guard assistance(with RW and bed to recliner transfer completed )  Sit to stand: Contact guard assistance  Stand to sit: Contact guard assistance  Transfer Comments: verbal instruction needed for upright posture, RW safety, pursed lip breathing, controlled stand to sit and assist/awareness of all lines                        Cognition  Overall Cognitive Status: Exceptions  Arousal/Alertness: Appropriate responses to stimuli  Following Commands:  Follows all commands without difficulty  Attention Span: Appears intact  Memory: Appears intact  Safety Judgement: Decreased awareness of need for safety  Problem Solving: Assistance required to identify errors made;Assistance required to correct errors made;Decreased awareness of errors  Insights: Decreased awareness of deficits  Initiation: Requires cues for some  Sequencing: Does not require cues                       LUE AROM (degrees)  LUE AROM : WFL  RUE AROM (degrees)  RUE AROM : WFL                 Plan   Plan  Times per week: 4-6x/week  Times per day: Daily  Current

## 2019-09-05 VITALS
OXYGEN SATURATION: 97 % | WEIGHT: 315 LBS | TEMPERATURE: 98.1 F | BODY MASS INDEX: 46.65 KG/M2 | HEART RATE: 67 BPM | SYSTOLIC BLOOD PRESSURE: 147 MMHG | RESPIRATION RATE: 16 BRPM | HEIGHT: 69 IN | DIASTOLIC BLOOD PRESSURE: 64 MMHG

## 2019-09-05 LAB
-: NORMAL
AMORPHOUS: NORMAL
ANION GAP SERPL CALCULATED.3IONS-SCNC: 12 MMOL/L (ref 9–17)
BACTERIA: NORMAL
BILIRUBIN URINE: NEGATIVE
BUN BLDV-MCNC: 32 MG/DL (ref 8–23)
BUN/CREAT BLD: 17 (ref 9–20)
CALCIUM SERPL-MCNC: 8.1 MG/DL (ref 8.6–10.4)
CASTS UA: NORMAL /LPF
CASTS UA: NORMAL /LPF
CHLORIDE BLD-SCNC: 100 MMOL/L (ref 98–107)
CO2: 21 MMOL/L (ref 20–31)
COLOR: YELLOW
COMMENT UA: ABNORMAL
CREAT SERPL-MCNC: 1.93 MG/DL (ref 0.7–1.2)
CRYSTALS, UA: NORMAL /HPF
EPITHELIAL CELLS UA: NORMAL /HPF (ref 0–5)
GFR AFRICAN AMERICAN: 42 ML/MIN
GFR NON-AFRICAN AMERICAN: 35 ML/MIN
GFR SERPL CREATININE-BSD FRML MDRD: ABNORMAL ML/MIN/{1.73_M2}
GFR SERPL CREATININE-BSD FRML MDRD: ABNORMAL ML/MIN/{1.73_M2}
GLUCOSE BLD-MCNC: 106 MG/DL (ref 70–99)
GLUCOSE URINE: NEGATIVE
HCT VFR BLD CALC: 39.7 % (ref 40.7–50.3)
HEMOGLOBIN: 12.7 G/DL (ref 13–17)
INR BLD: 2.9
KETONES, URINE: NEGATIVE
LEUKOCYTE ESTERASE, URINE: NEGATIVE
MAGNESIUM: 1.6 MG/DL (ref 1.6–2.6)
MCH RBC QN AUTO: 27.5 PG (ref 25.2–33.5)
MCHC RBC AUTO-ENTMCNC: 32 G/DL (ref 28.4–34.8)
MCV RBC AUTO: 86.1 FL (ref 82.6–102.9)
MUCUS: NORMAL
NITRITE, URINE: NEGATIVE
NRBC AUTOMATED: 0 PER 100 WBC
OTHER OBSERVATIONS UA: NORMAL
PDW BLD-RTO: 15.5 % (ref 11.8–14.4)
PH UA: 5.5 (ref 5–8)
PHOSPHORUS: 2.5 MG/DL (ref 2.5–4.5)
PLATELET # BLD: 166 K/UL (ref 138–453)
PMV BLD AUTO: 11.2 FL (ref 8.1–13.5)
POTASSIUM SERPL-SCNC: 4.6 MMOL/L (ref 3.7–5.3)
PROTEIN UA: ABNORMAL
PROTHROMBIN TIME: 28.9 SEC (ref 9.7–11.6)
RBC # BLD: 4.61 M/UL (ref 4.21–5.77)
RBC UA: NORMAL /HPF (ref 0–2)
RENAL EPITHELIAL, UA: NORMAL /HPF
SODIUM BLD-SCNC: 133 MMOL/L (ref 135–144)
SPECIFIC GRAVITY UA: 1.02 (ref 1–1.03)
TRICHOMONAS: NORMAL
TURBIDITY: CLEAR
URINE HGB: ABNORMAL
UROBILINOGEN, URINE: NORMAL
WBC # BLD: 6 K/UL (ref 3.5–11.3)
WBC UA: NORMAL /HPF (ref 0–5)
YEAST: NORMAL

## 2019-09-05 PROCEDURE — 6360000002 HC RX W HCPCS: Performed by: INTERNAL MEDICINE

## 2019-09-05 PROCEDURE — 6370000000 HC RX 637 (ALT 250 FOR IP): Performed by: INTERNAL MEDICINE

## 2019-09-05 PROCEDURE — 2580000003 HC RX 258: Performed by: INTERNAL MEDICINE

## 2019-09-05 PROCEDURE — 87086 URINE CULTURE/COLONY COUNT: CPT

## 2019-09-05 PROCEDURE — 84100 ASSAY OF PHOSPHORUS: CPT

## 2019-09-05 PROCEDURE — 36415 COLL VENOUS BLD VENIPUNCTURE: CPT

## 2019-09-05 PROCEDURE — 80048 BASIC METABOLIC PNL TOTAL CA: CPT

## 2019-09-05 PROCEDURE — 85610 PROTHROMBIN TIME: CPT

## 2019-09-05 PROCEDURE — 97530 THERAPEUTIC ACTIVITIES: CPT

## 2019-09-05 PROCEDURE — 81001 URINALYSIS AUTO W/SCOPE: CPT

## 2019-09-05 PROCEDURE — 85027 COMPLETE CBC AUTOMATED: CPT

## 2019-09-05 PROCEDURE — 83735 ASSAY OF MAGNESIUM: CPT

## 2019-09-05 RX ORDER — CEPHALEXIN 500 MG/1
500 CAPSULE ORAL 2 TIMES DAILY
Status: DISCONTINUED | OUTPATIENT
Start: 2019-09-05 | End: 2019-09-05 | Stop reason: HOSPADM

## 2019-09-05 RX ORDER — MAGNESIUM SULFATE 1 G/100ML
1 INJECTION INTRAVENOUS
Status: COMPLETED | OUTPATIENT
Start: 2019-09-05 | End: 2019-09-05

## 2019-09-05 RX ORDER — AMLODIPINE BESYLATE 2.5 MG/1
2.5 TABLET ORAL DAILY
Qty: 30 TABLET | Refills: 0 | Status: SHIPPED | OUTPATIENT
Start: 2019-09-05 | End: 2020-12-21 | Stop reason: SDUPTHER

## 2019-09-05 RX ORDER — CEPHALEXIN 500 MG/1
500 CAPSULE ORAL 2 TIMES DAILY
Qty: 14 CAPSULE | Refills: 0 | Status: SHIPPED | OUTPATIENT
Start: 2019-09-06 | End: 2019-09-13

## 2019-09-05 RX ADMIN — MAGNESIUM SULFATE HEPTAHYDRATE 1 G: 1 INJECTION, SOLUTION INTRAVENOUS at 12:08

## 2019-09-05 RX ADMIN — CEPHALEXIN 500 MG: 500 CAPSULE ORAL at 15:46

## 2019-09-05 RX ADMIN — SODIUM CHLORIDE: 9 INJECTION, SOLUTION INTRAVENOUS at 06:37

## 2019-09-05 RX ADMIN — MAGNESIUM SULFATE HEPTAHYDRATE 1 G: 1 INJECTION, SOLUTION INTRAVENOUS at 12:51

## 2019-09-05 NOTE — PROGRESS NOTES
catheterization; tracheostomy; Colonoscopy; Endoscopy, colon, diagnostic; and ablation of dysrhythmic focus (12/12/2016). Restrictions  Restrictions/Precautions  Restrictions/Precautions: Fall Risk, Contact Precautions  Position Activity Restriction  Other position/activity restrictions: 413lbs; bariatric pressure adjusted bed, 2 L O2 per NC, RUE IV, 1200 ml fluid restruiction, up with assist  Subjective   General  Chart Reviewed: Yes  Response To Previous Treatment: Patient with no complaints from previous session. Family / Caregiver Present: No  Pain Screening  Patient Currently in Pain: Denies  Vital Signs  Patient Currently in Pain: Denies       Orientation  Orientation  Overall Orientation Status: Within Functional Limits  Cognition      Objective   Bed mobility  Bridging: Modified independent   Rolling to Left: Modified independent  Rolling to Right: Modified independent  Supine to Sit: Modified independent  Sit to Supine: Modified independent  Scooting: Modified independent  Transfers  Sit to Stand: Stand by assistance  Stand to sit: Stand by assistance  Bed to Chair: Stand by assistance  Stand Pivot Transfers: Stand by assistance  Ambulation  Ambulation?: Yes  Ambulation 1  Device: Rolling Walker  Assistance: Stand by assistance  Quality of Gait: Pt with increased control today w/ mobility and less use of momemtum to complete movements. Distance: 4 ft from chair to bed. Pt declining further gait as \"I have been up and down all day\"        Balance  Posture: Fair  Sitting - Static: Good  Sitting - Dynamic: Good  Standing - Static: Good;-  Standing - Dynamic: Good;-  Comments: standing balance w / rwalker  Exercises  Comments: Reviewed supine exercises -> pt able to repeat back.             AM-PAC Score  AM-PAC Inpatient Mobility Raw Score : 22/24    Goals  Short term goals  Time Frame for Short term goals: 12 tx's  Short term goal 1: Bed mobility w/ rails independnet  Short term goal 2: Transfers min

## 2019-09-05 NOTE — PROGRESS NOTES
Subjective:    Acute kidney injury  Doing fairly well eating well no nausea no vomiting no change in bowel habits no dysuria no hematuria no frequent  ROS  No fever no chills no GI  complaints no acute cardiopulmonary complaints using CPAP at night sleep and better no TIA no bleeding no headache no sore throat no skin lesions no polyuria no polydipsia no hypoglycemia  physical exam  General Appearance: alert and oriented to person, place and time and in no acute distress  Skin: warm and dry, no rash or erythema  Head: normocephalic and atraumatic  Eyes: pupils equal, round, and reactive to light and sclera anicteric    Neck: neck supple and non tender without mass   Pulmonary/Chest: clear to auscultation bilaterally- no wheezes, rales or rhonchi, normal air movement, no respiratory distress  Cardiovascular: normal rate, regular rhythm, normal S1 and S2, no gallops, intact distal pulses and no carotid bruits  Abdomen: soft, non-tender, non-distended, normal bowel sounds, no masses or organomegaly  Extremities: no edema and has no Homans sign  Neurologic: Oriented x3 with no focal deficit    /66   Pulse 75   Temp 98.6 °F (37 °C) (Oral)   Resp 20   Ht 5' 9\" (1.753 m)   Wt (!) 427 lb 11.1 oz (194 kg)   SpO2 95%   BMI 63.16 kg/m²     CBC:   Lab Results   Component Value Date    WBC 6.2 09/03/2019    RBC 4.31 09/03/2019    HGB 12.1 09/03/2019    HCT 37.2 09/03/2019    MCV 86.3 09/03/2019    MCH 28.1 09/03/2019    MCHC 32.5 09/03/2019    RDW 15.3 09/03/2019     09/03/2019    MPV 10.6 09/03/2019     BMP:    Lab Results   Component Value Date     09/04/2019    K 4.7 09/04/2019    CL 99 09/04/2019    CO2 21 09/04/2019    BUN 40 09/04/2019    LABALBU 2.9 09/04/2019    CREATININE 2.50 09/04/2019    CALCIUM 8.4 09/04/2019    GFRAA 31 09/04/2019    LABGLOM 26 09/04/2019    GLUCOSE 120 09/04/2019        Assessment:  Patient Active Problem List   Diagnosis    Dyspnea    Morbid obesity with BMI of 60.0-69.9, adult [facility]    Elevated brain natriuretic peptide (BNP) level    Dyspnea on exertion    DVT (deep venous thrombosis) (HCC)    Acute respiratory failure with hypoxia (HCC)    Respiratory acidosis    Cor pulmonale (HCC)    Gross hematuria    Atelectasis of left lower lobe    Pneumonia    MRSA (methicillin resistant staph aureus) culture positive    STEPH (obstructive sleep apnea)    Oral bleeding    MRSA (methicillin resistant Staphylococcus aureus)    Morbid obesity with BMI of 60.0-69.9, adult ([facility])    Tibial DVT (deep venous thrombosis) (HCC)    Respiratory failure (HCC)    Toxic metabolic encephalopathy    Critical illness myopathy    Critical illness neuropathy (HCC)    Polyneuropathy    Persistent cough    Sacral decubitus ulcer    Arm vein blood clot    Ureteral calculus, right    Ureteral calculus, right    Hydronephrosis, right    Leukocytosis    Right kidney stone    Tachycardia    Elevated serum creatinine    Obstructed, uropathy    Atrial fibrillation with RVR (HCC)    Pulmonary hypertension (HCC)    Urinary tract infection with hematuria    Atypical atrial flutter (HCC)    Bandemia    C. difficile diarrhea    Bradycardia    Ureteral calculus, right    ZACH (acute kidney injury) (HCC)    Chronic abdominal pain   Acute kidney injury on top of CKD 3  Hypovolemic hyponatremia  Hypertension essential  STEPH on CPAP thrombocytopenia mild    Hyponatremia  Axis mild atrial fibrillation renew with Coumadin side effects precaution advised INR currently is 2.1 therapeutic  Iron Deficiency anemia likely work-up as an outpatient  Out of 2 bottles were positive repeat blood cultures negative lactic acid was normal likely contamination will continue for now with the Rocephin    Plan:    Labs reviewed Meds reviewed   Physical therapy occupational therapy repeat BMP CBC in the morning avoid nephrotoxic drug see orders      Zeeshan Santana MD  8:27 PM

## 2019-09-05 NOTE — PLAN OF CARE
Problem: Pain:  Goal: Pain level will decrease  Description  Pain level will decrease  9/5/2019 1348 by Rodger Wilson RN  Outcome: Ongoing  9/5/2019 0233 by Pepper Orr RN  Outcome: Ongoing  Goal: Control of acute pain  Description  Control of acute pain  9/5/2019 1348 by Rodger Wilson RN  Outcome: Ongoing  9/5/2019 0233 by Pepper Orr RN  Outcome: Ongoing  Goal: Control of chronic pain  Description  Control of chronic pain  9/5/2019 0233 by Pepper Orr RN  Outcome: Ongoing  Goal: Patient's pain/discomfort is manageable  Description  Patient's pain/discomfort is manageable  9/5/2019 0233 by Pepper Orr RN  Outcome: Ongoing     Problem: Falls - Risk of:  Goal: Will remain free from falls  Description  Will remain free from falls  9/5/2019 1348 by Rodger Wilson RN  Outcome: Ongoing  9/5/2019 0233 by Pepper Orr RN  Outcome: Ongoing  Goal: Absence of physical injury  Description  Absence of physical injury  9/5/2019 1348 by Rodger Wilson RN  Outcome: Ongoing  9/5/2019 0233 by Pepper Orr RN  Outcome: Ongoing     Problem: Infection:  Goal: Will remain free from infection  Description  Will remain free from infection  9/5/2019 0233 by Pepper Orr RN  Outcome: Ongoing     Problem: Safety:  Goal: Free from accidental physical injury  Description  Free from accidental physical injury  9/5/2019 0233 by Pepper Orr RN  Outcome: Ongoing     Problem: Daily Care:  Goal: Daily care needs are met  Description  Daily care needs are met  9/5/2019 0233 by Peppre Orr RN  Outcome: Ongoing     Problem: Skin Integrity:  Goal: Skin integrity will stabilize  Description  Skin integrity will stabilize  9/5/2019 0233 by Pepper Orr RN  Outcome: Ongoing     Problem: Discharge Planning:  Goal: Patients continuum of care needs are met  Description  Patients continuum of care needs are met  9/5/2019 0233 by Pepper Cameron

## 2019-09-05 NOTE — PLAN OF CARE
Problem: Pain:  Goal: Pain level will decrease  Description  Pain level will decrease  Outcome: Ongoing     Problem: Pain:  Goal: Control of acute pain  Description  Control of acute pain  Outcome: Ongoing     Problem: Pain:  Goal: Control of chronic pain  Description  Control of chronic pain  Outcome: Ongoing     Problem: Pain:  Goal: Patient's pain/discomfort is manageable  Description  Patient's pain/discomfort is manageable  Outcome: Ongoing     Problem: Falls - Risk of:  Goal: Will remain free from falls  Description  Will remain free from falls  Outcome: Ongoing     Problem: Falls - Risk of:  Goal: Absence of physical injury  Description  Absence of physical injury  Outcome: Ongoing     Problem: Infection:  Goal: Will remain free from infection  Description  Will remain free from infection  Outcome: Ongoing     Problem: Safety:  Goal: Free from accidental physical injury  Description  Free from accidental physical injury  Outcome: Ongoing     Problem: Daily Care:  Goal: Daily care needs are met  Description  Daily care needs are met  Outcome: Ongoing     Problem: Skin Integrity:  Goal: Skin integrity will stabilize  Description  Skin integrity will stabilize  Outcome: Ongoing     Problem: Discharge Planning:  Goal: Patients continuum of care needs are met  Description  Patients continuum of care needs are met  Outcome: Ongoing     Problem: Musculor/Skeletal Functional Status  Goal: Highest potential functional level  Outcome: Ongoing     Problem: Risk for Impaired Skin Integrity  Goal: Tissue integrity - skin and mucous membranes  Description  Structural intactness and normal physiological function of skin and  mucous membranes.   Outcome: Ongoing

## 2019-09-05 NOTE — CARE COORDINATION
nHpredict Inpatient Visit    Patient/family seen: No: pt is going home independently. PT/ OT notes show suggestion of home with assist prn or outpatient therapy     Provided patient/family with copy of nHpredict tool: No: pt going home independently     All questions answered at the time of visit. Informed patient/family that the nHpredict is a tool, to be used as a guide, and should not alter their currently established discharge plan. Current discharge plan: home independently     Collaboration completed with case management: Yes. Writer also spoke with pt RN Eldon Gonzalez who states pt has been out of bed without difficulty. nHpredict Variation:  nHpredict in media. Variation noted between nHpredict recommendations and current patient discharge plan. Please note that nHpredict is a tool, used as a guide, and should not alter the final discharge plan. Please see the attached nH Predict/Outcome completed on patient [facility]. Recommended discharge plan at this time is for SNF placement with an Estimated Length of Stay to be approximately 15 days. Please print out the [facility] Predict/Outcome and place on the patient's medical record (or send to medical records to be scanned in). If you have any questions, feel free to call or contact me. Thanks,  Purvi Naylor RN  Centralized Care Coordinator  O: [phone]  ext.  [phone]  C: [phone]
Transitions Inpatient Review  Medication Review  Housing Review  Who do you live with?:  Partner/Spouse/SO  Are you an active caregiver in your home?:  No  Social Support  Durable Medical Equipment  Functional Review  Ability to seek help/take action for Emergent/Urgent situations i.e. fire, crime, inclement weather or health crisis. :  Independent  Ability handle personal hygiene needs (bathing/dressing/grooming): Independent  Ability to manage medications: Independent  Ability to prepare food:  Independent  Ability to maintain home (clean home, laundry): Independent  Ability to drive and/or has transportation:  Independent  Ability to do shopping:  Independent  Ability to manage finances:   Independent  Is patient able to live independently?:  Yes  Hearing and Vision  Care Transitions Interventions         Follow Up  Future Appointments   Date Time Provider Eli Bond   9/23/2019  1:30 PM Teodoro [address] Maintenance  Health Maintenance Due   Topic Date Due    A1C test (Diabetic or Prediabetic)  07/30/2015    Lipid screen  07/30/2019       Lavonne Gaitan RN

## 2019-09-05 NOTE — PROGRESS NOTES
Nephrology Progress Note        Subjective: the patient is feeling better today, he denies SOB, no nausea, vomiting or diarrhea, good po intake, BP stable, weight down, on IVF, non oliguric, no walker, denies hematuria, dysuria or retention    Objective:  CURRENT TEMPERATURE:  Temp: 98.1 °F (36.7 °C)  MAXIMUM TEMPERATURE OVER 24HRS:  Temp (24hrs), Av.1 °F (36.7 °C), Min:97.3 °F (36.3 °C), Max:98.8 °F (37.1 °C)    CURRENT RESPIRATORY RATE:  Resp: 16  CURRENT PULSE:  Pulse: 67  CURRENT BLOOD PRESSURE:  BP: (!) 147/64  24HR BLOOD PRESSURE RANGE:  Systolic (71LKE), CHQ:433 , Min:121 , DVE:107   ; Diastolic (25ZEI), APN:18, Min:46, Max:66    24HR INTAKE/OUTPUT:      Intake/Output Summary (Last 24 hours) at 2019 1140  Last data filed at 2019 0826  Gross per 24 hour   Intake 1260 ml   Output 675 ml   Net 585 ml     Weight   Wt Readings from Last 3 Encounters:   19 (!) 413 lb 14.4 oz (187.7 kg)   18 (!) 430 lb (195 kg)   17 (!) 438 lb (198.7 kg)       Physical Exam:  Awake, alert, in no acute distress  Skin: warm and dry, no rash or erythema  Pulmonary: clear to auscultation bilaterally- no wheezes, rales or rhonchi, normal air movement, no respiratory distress  Cardiovascular: normal rate and normal S1 and S2  Abdomen: soft nontender, bowel sounds present, no organomegaly,  no ascites  Extremities: 1+ edema    Current Medications:      melatonin tablet 3 mg Nightly PRN   acetaminophen (TYLENOL) tablet 650 mg Q6H PRN   cefTRIAXone (ROCEPHIN) 1 g IVPB in 50 mL D5W minibag Q24H   warfarin (COUMADIN) daily dosing (placeholder) RX Placeholder   amitriptyline (ELAVIL) tablet 50 mg Nightly   sodium chloride flush 0.9 % injection 10 mL 2 times per day   sodium chloride flush 0.9 % injection 10 mL PRN   0.9 % sodium chloride infusion Continuous   ondansetron (ZOFRAN-ODT) disintegrating tablet 4 mg Q6H PRN   Or    ondansetron (ZOFRAN) injection 4 mg Q6H PRN       Labs:    CBC:  Recent Labs

## 2019-09-05 NOTE — PROGRESS NOTES
Dr. Izzy Bravo returned call updated on patient status, new order fr Kelfe[employer] received.  Dr. Izzy Bravo states he will be in to see patient

## 2019-09-06 ENCOUNTER — CARE COORDINATION (OUTPATIENT)
Dept: CASE MANAGEMENT | Age: 69
End: 2019-09-06

## 2019-09-06 LAB
CULTURE: NO GROWTH
Lab: NORMAL
SPECIMEN DESCRIPTION: NORMAL

## 2019-09-06 NOTE — PLAN OF CARE
Problem: Pain:  Goal: Pain level will decrease  Description  Pain level will decrease  9/5/2019 2000 by Patrick Pulido RN  Outcome: Completed  9/5/2019 1348 by Steven Cooper RN  Outcome: Ongoing  Goal: Control of acute pain  Description  Control of acute pain  9/5/2019 2000 by Patrick Pulido RN  Outcome: Completed  9/5/2019 1348 by Steven Cooper RN  Outcome: Ongoing  Goal: Control of chronic pain  Description  Control of chronic pain  Outcome: Completed  Goal: Patient's pain/discomfort is manageable  Description  Patient's pain/discomfort is manageable  Outcome: Completed     Problem: Falls - Risk of:  Goal: Will remain free from falls  Description  Will remain free from falls  9/5/2019 2000 by Patrick Pulido RN  Outcome: Completed  9/5/2019 1348 by Steven Cooper RN  Outcome: Ongoing  Goal: Absence of physical injury  Description  Absence of physical injury  9/5/2019 2000 by Patrick Pulido RN  Outcome: Completed  9/5/2019 1348 by Steven Cooper RN  Outcome: Ongoing     Problem: Infection:  Goal: Will remain free from infection  Description  Will remain free from infection  Outcome: Completed     Problem: Safety:  Goal: Free from accidental physical injury  Description  Free from accidental physical injury  Outcome: Completed     Problem: Daily Care:  Goal: Daily care needs are met  Description  Daily care needs are met  Outcome: Completed     Problem: Skin Integrity:  Goal: Skin integrity will stabilize  Description  Skin integrity will stabilize  Outcome: Completed     Problem: Discharge Planning:  Goal: Patients continuum of care needs are met  Description  Patients continuum of care needs are met  Outcome: Completed     Problem: Musculor/Skeletal Functional Status  Goal: Highest potential functional level  Outcome: Completed     Problem: Risk for Impaired Skin Integrity  Goal: Tissue integrity - skin and mucous membranes  Description  Structural intactness and normal physiological function of skin and  mucous membranes.   Outcome: Completed

## 2019-09-06 NOTE — CARE COORDINATION
Jodie [facility] Transitions Initial Follow Up Call- BPCI-A patient (Renal failure- )      Call within 2 business days of discharge: Yes    Patient: Merita Cockayne Patient : 1950   MRN: [id number]  Reason for Admission: ZACH   Discharge Date: 19 RARS: Readmission Risk Score: 14      Last Discharge [facility]       Complaint Diagnosis Description Type Department Provider    19 Hand Injury; Back Pain; Fall ZACH (acute kidney injury) [facility]) ED to Hosp-Admission (Discharged) (ADMITTED) Fred Grady MD; Anton Vizcarra. .. Spoke with: Sinai Ramsey    Call to pt who states he is doing well- just tired. States he is using walker for now while up  States he will call Monday to schedule with Dr Yoko Alanis (neph). [facility] Dr Yanique Buck office called to schedule follow up 19. Confirms he has new meds and notes meds discontinued on AVS. Medications reconciled.    Denies needs  Confirms he has writer contact information   Encouraged to call writer/ CTC or providers if questions or concerns- v/u     Facility: [address]     Non-face-to-face services provided:  Scheduled appointment with PCP-19  Scheduled appointment with Specialist-pt calling on Monday to schedule  Obtained and reviewed discharge summary and/or continuity of care documents  Assessment and support for treatment adherence and medication management-meds reconciled     Care Transitions 24 Hour Call    Do you have any ongoing symptoms?:  No  Do you have a copy of your discharge instructions?:  Yes  Do you have all of your prescriptions and are they filled?:  Yes  Have you been contacted by a [facility] Pharmacist?:  No  Have you scheduled your follow up appointment?:  Yes  How are you going to get to your appointment?:  Car - drive self  Were you discharged with any Home Care or Post Acute Services:  No  Do you have support at home?:  Partner/Spouse/SO  Do you feel like you have everything you need to keep you well at home?:

## 2019-09-08 LAB
CULTURE: NORMAL
Lab: NORMAL
SPECIMEN DESCRIPTION: NORMAL

## 2019-09-08 NOTE — DISCHARGE SUMMARY
were obtained no evidence of any infection patient was discharged in a stable improved condition no major complication during his stay    Discharge Exam:  See progress note from today    Disposition: home  Stable improved  Patient Instructions:   Discharge Medication List as of 9/5/2019  7:29 PM      START taking these medications    Details   cephALEXin (KEFLEX) 500 MG capsule Take 1 capsule by mouth 2 times daily for 7 days, Disp-14 capsule, R-0Print      amLODIPine (NORVASC) 2.5 MG tablet Take 1 tablet by mouth daily, Disp-30 tablet, R-0Print         CONTINUE these medications which have NOT CHANGED    Details   warfarin (COUMADIN) 10 MG tablet Take 1 tablet by mouth daily as directed by [facility]., Disp-90 tablet, R-3Phone In      vitamin B-12 (CYANOCOBALAMIN) 1000 MCG tablet Take 1,000 mcg by mouth dailyHistorical Med      calcium acetate (PHOSLO) 667 MG capsule Take 1 capsule by mouth 3 times daily (with meals), Disp-60 capsule, R-3      amitriptyline (ELAVIL) 25 MG tablet Take 50 mg by mouth nightly          STOP taking these medications       pregabalin (LYRICA) 25 MG capsule Comments:   Reason for Stopping:         lisinopril (PRINIVIL;ZESTRIL) 5 MG tablet Comments:   Reason for Stopping:             Activity: activity as tolerated  Diet: renal diet    Follow-up with pCP in 1 week. Nephrology in 2 weeks  Signed:   Galen Roy MD  9/8/2019  3:41 PM

## 2019-09-09 ENCOUNTER — APPOINTMENT (OUTPATIENT)
Dept: GENERAL RADIOLOGY | Age: 69
DRG: 542 | End: 2019-09-09
Payer: MEDICARE

## 2019-09-09 ENCOUNTER — APPOINTMENT (OUTPATIENT)
Dept: CT IMAGING | Age: 69
DRG: 542 | End: 2019-09-09
Payer: MEDICARE

## 2019-09-09 ENCOUNTER — HOSPITAL ENCOUNTER (INPATIENT)
Age: 69
LOS: 3 days | Discharge: HOME | DRG: 542 | End: 2019-09-12
Attending: EMERGENCY MEDICINE | Admitting: HOSPITALIST
Payer: MEDICARE

## 2019-09-09 DIAGNOSIS — R52 INTRACTABLE PAIN: Primary | ICD-10-CM

## 2019-09-09 DIAGNOSIS — R93.89 ABNORMAL CT OF THE CHEST: ICD-10-CM

## 2019-09-09 DIAGNOSIS — M80.08XA PATHOLOGICAL FRACTURE OF SACRAL VERTEBRA DUE TO OSTEOPOROSIS (HCC): ICD-10-CM

## 2019-09-09 DIAGNOSIS — R06.00 DYSPNEA, UNSPECIFIED TYPE: ICD-10-CM

## 2019-09-09 LAB
% CKMB: 1.7 % (ref 0–3.5)
ABSOLUTE EOS #: 0 K/UL (ref 0–0.4)
ABSOLUTE IMMATURE GRANULOCYTE: 0.91 K/UL (ref 0–0.3)
ABSOLUTE LYMPH #: 3.42 K/UL (ref 1–4.8)
ABSOLUTE MONO #: 0.46 K/UL (ref 0.2–0.8)
ANION GAP SERPL CALCULATED.3IONS-SCNC: 12 MMOL/L (ref 9–17)
ATYPICAL LYMPHOCYTE ABSOLUTE COUNT: 0.57 K/UL
ATYPICAL LYMPHOCYTES: 5 %
BASOPHILS # BLD: 2 %
BASOPHILS ABSOLUTE: 0.23 K/UL (ref 0–0.2)
BNP INTERPRETATION: ABNORMAL
BUN BLDV-MCNC: 19 MG/DL (ref 8–23)
BUN/CREAT BLD: 13 (ref 9–20)
CALCIUM SERPL-MCNC: 8.3 MG/DL (ref 8.6–10.4)
CHLORIDE BLD-SCNC: 101 MMOL/L (ref 98–107)
CK MB: 5.9 NG/ML
CKMB INTERPRETATION: ABNORMAL
CO2: 18 MMOL/L (ref 20–31)
CREAT SERPL-MCNC: 1.46 MG/DL (ref 0.7–1.2)
CULTURE: NORMAL
D-DIMER QUANTITATIVE: 0.96 MG/L FEU
DIFFERENTIAL TYPE: ABNORMAL
EKG ATRIAL RATE: 91 BPM
EKG P AXIS: 39 DEGREES
EKG P-R INTERVAL: 140 MS
EKG Q-T INTERVAL: 376 MS
EKG QRS DURATION: 84 MS
EKG QTC CALCULATION (BAZETT): 462 MS
EKG R AXIS: -58 DEGREES
EKG T AXIS: 16 DEGREES
EKG VENTRICULAR RATE: 91 BPM
EOSINOPHILS RELATIVE PERCENT: 0 % (ref 1–4)
GFR AFRICAN AMERICAN: 58 ML/MIN
GFR NON-AFRICAN AMERICAN: 48 ML/MIN
GFR SERPL CREATININE-BSD FRML MDRD: ABNORMAL ML/MIN/{1.73_M2}
GFR SERPL CREATININE-BSD FRML MDRD: ABNORMAL ML/MIN/{1.73_M2}
GLUCOSE BLD-MCNC: 131 MG/DL (ref 70–99)
HCT VFR BLD CALC: 39.2 % (ref 40.7–50.3)
HEMOGLOBIN: 13 G/DL (ref 13–17)
IMMATURE GRANULOCYTES: 8 %
INR BLD: 3.4
LYMPHOCYTES # BLD: 30 % (ref 24–44)
Lab: NORMAL
MCH RBC QN AUTO: 27.8 PG (ref 25.2–33.5)
MCHC RBC AUTO-ENTMCNC: 33.2 G/DL (ref 28.4–34.8)
MCV RBC AUTO: 83.9 FL (ref 82.6–102.9)
MONOCYTES # BLD: 4 % (ref 1–7)
MYOGLOBIN: 207 NG/ML (ref 28–72)
NRBC AUTOMATED: 0 PER 100 WBC
PARTIAL THROMBOPLASTIN TIME: 37.9 SEC (ref 23–31)
PDW BLD-RTO: 15.4 % (ref 11.8–14.4)
PLATELET # BLD: 177 K/UL (ref 138–453)
PLATELET ESTIMATE: ABNORMAL
PMV BLD AUTO: 9.8 FL (ref 8.1–13.5)
POTASSIUM SERPL-SCNC: 4.3 MMOL/L (ref 3.7–5.3)
PRO-BNP: 1857 PG/ML
PROTHROMBIN TIME: 33.8 SEC (ref 9.7–11.6)
RBC # BLD: 4.67 M/UL (ref 4.21–5.77)
RBC # BLD: ABNORMAL 10*6/UL
SEG NEUTROPHILS: 51 % (ref 36–66)
SEGMENTED NEUTROPHILS ABSOLUTE COUNT: 5.81 K/UL (ref 1.8–7.7)
SODIUM BLD-SCNC: 131 MMOL/L (ref 135–144)
SPECIMEN DESCRIPTION: NORMAL
TOTAL CK: 345 U/L (ref 39–308)
TROPONIN INTERP: ABNORMAL
TROPONIN T: ABNORMAL NG/ML
TROPONIN, HIGH SENSITIVITY: 20 NG/L (ref 0–22)
WBC # BLD: 11.4 K/UL (ref 3.5–11.3)
WBC # BLD: ABNORMAL 10*3/UL

## 2019-09-09 PROCEDURE — 85730 THROMBOPLASTIN TIME PARTIAL: CPT

## 2019-09-09 PROCEDURE — 96374 THER/PROPH/DIAG INJ IV PUSH: CPT

## 2019-09-09 PROCEDURE — 71045 X-RAY EXAM CHEST 1 VIEW: CPT

## 2019-09-09 PROCEDURE — 6370000000 HC RX 637 (ALT 250 FOR IP): Performed by: FAMILY MEDICINE

## 2019-09-09 PROCEDURE — 71250 CT THORAX DX C-: CPT

## 2019-09-09 PROCEDURE — 83874 ASSAY OF MYOGLOBIN: CPT

## 2019-09-09 PROCEDURE — 85379 FIBRIN DEGRADATION QUANT: CPT

## 2019-09-09 PROCEDURE — 1200000000 HC SEMI PRIVATE

## 2019-09-09 PROCEDURE — 80048 BASIC METABOLIC PNL TOTAL CA: CPT

## 2019-09-09 PROCEDURE — 6360000002 HC RX W HCPCS: Performed by: EMERGENCY MEDICINE

## 2019-09-09 PROCEDURE — 6360000002 HC RX W HCPCS: Performed by: FAMILY MEDICINE

## 2019-09-09 PROCEDURE — 93005 ELECTROCARDIOGRAM TRACING: CPT | Performed by: EMERGENCY MEDICINE

## 2019-09-09 PROCEDURE — 84484 ASSAY OF TROPONIN QUANT: CPT

## 2019-09-09 PROCEDURE — 96376 TX/PRO/DX INJ SAME DRUG ADON: CPT

## 2019-09-09 PROCEDURE — 6360000002 HC RX W HCPCS: Performed by: NURSE PRACTITIONER

## 2019-09-09 PROCEDURE — 2580000003 HC RX 258: Performed by: FAMILY MEDICINE

## 2019-09-09 PROCEDURE — 82550 ASSAY OF CK (CPK): CPT

## 2019-09-09 PROCEDURE — 85025 COMPLETE CBC W/AUTO DIFF WBC: CPT

## 2019-09-09 PROCEDURE — 96375 TX/PRO/DX INJ NEW DRUG ADDON: CPT

## 2019-09-09 PROCEDURE — 83880 ASSAY OF NATRIURETIC PEPTIDE: CPT

## 2019-09-09 PROCEDURE — 99285 EMERGENCY DEPT VISIT HI MDM: CPT

## 2019-09-09 PROCEDURE — 85610 PROTHROMBIN TIME: CPT

## 2019-09-09 PROCEDURE — 82553 CREATINE MB FRACTION: CPT

## 2019-09-09 PROCEDURE — 6370000000 HC RX 637 (ALT 250 FOR IP): Performed by: HOSPITALIST

## 2019-09-09 PROCEDURE — 6360000002 HC RX W HCPCS: Performed by: HOSPITALIST

## 2019-09-09 PROCEDURE — 36415 COLL VENOUS BLD VENIPUNCTURE: CPT

## 2019-09-09 RX ORDER — WARFARIN SODIUM 10 MG/1
10 TABLET ORAL DAILY
Status: DISCONTINUED | OUTPATIENT
Start: 2019-09-09 | End: 2019-09-09

## 2019-09-09 RX ORDER — HYDROMORPHONE HYDROCHLORIDE 1 MG/ML
0.5 INJECTION, SOLUTION INTRAMUSCULAR; INTRAVENOUS; SUBCUTANEOUS ONCE
Status: COMPLETED | OUTPATIENT
Start: 2019-09-09 | End: 2019-09-09

## 2019-09-09 RX ORDER — SODIUM CHLORIDE 9 MG/ML
INJECTION, SOLUTION INTRAVENOUS CONTINUOUS
Status: DISCONTINUED | OUTPATIENT
Start: 2019-09-09 | End: 2019-09-09

## 2019-09-09 RX ORDER — ONDANSETRON 2 MG/ML
4 INJECTION INTRAMUSCULAR; INTRAVENOUS EVERY 6 HOURS PRN
Status: DISCONTINUED | OUTPATIENT
Start: 2019-09-09 | End: 2019-09-09 | Stop reason: CLARIF

## 2019-09-09 RX ORDER — NICOTINE 21 MG/24HR
1 PATCH, TRANSDERMAL 24 HOURS TRANSDERMAL DAILY PRN
Status: DISCONTINUED | OUTPATIENT
Start: 2019-09-09 | End: 2019-09-12 | Stop reason: HOSPADM

## 2019-09-09 RX ORDER — MORPHINE SULFATE 2 MG/ML
2 INJECTION, SOLUTION INTRAMUSCULAR; INTRAVENOUS EVERY 4 HOURS PRN
Status: DISCONTINUED | OUTPATIENT
Start: 2019-09-09 | End: 2019-09-12 | Stop reason: HOSPADM

## 2019-09-09 RX ORDER — SODIUM CHLORIDE 0.9 % (FLUSH) 0.9 %
10 SYRINGE (ML) INJECTION EVERY 12 HOURS SCHEDULED
Status: DISCONTINUED | OUTPATIENT
Start: 2019-09-09 | End: 2019-09-12 | Stop reason: HOSPADM

## 2019-09-09 RX ORDER — AMITRIPTYLINE HYDROCHLORIDE 50 MG/1
75 TABLET, FILM COATED ORAL NIGHTLY
Status: DISCONTINUED | OUTPATIENT
Start: 2019-09-09 | End: 2019-09-12 | Stop reason: HOSPADM

## 2019-09-09 RX ORDER — HYDROMORPHONE HYDROCHLORIDE 1 MG/ML
1 INJECTION, SOLUTION INTRAMUSCULAR; INTRAVENOUS; SUBCUTANEOUS ONCE
Status: COMPLETED | OUTPATIENT
Start: 2019-09-09 | End: 2019-09-09

## 2019-09-09 RX ORDER — SODIUM CHLORIDE 0.9 % (FLUSH) 0.9 %
10 SYRINGE (ML) INJECTION PRN
Status: DISCONTINUED | OUTPATIENT
Start: 2019-09-09 | End: 2019-09-09 | Stop reason: SDUPTHER

## 2019-09-09 RX ORDER — ALBUTEROL SULFATE 2.5 MG/3ML
2.5 SOLUTION RESPIRATORY (INHALATION)
Status: DISCONTINUED | OUTPATIENT
Start: 2019-09-09 | End: 2019-09-12 | Stop reason: HOSPADM

## 2019-09-09 RX ORDER — FAMOTIDINE 20 MG/1
20 TABLET, FILM COATED ORAL 2 TIMES DAILY
Status: DISCONTINUED | OUTPATIENT
Start: 2019-09-09 | End: 2019-09-12 | Stop reason: HOSPADM

## 2019-09-09 RX ORDER — METHYLPREDNISOLONE SODIUM SUCCINATE 125 MG/2ML
60 INJECTION, POWDER, LYOPHILIZED, FOR SOLUTION INTRAMUSCULAR; INTRAVENOUS EVERY 6 HOURS
Status: COMPLETED | OUTPATIENT
Start: 2019-09-09 | End: 2019-09-10

## 2019-09-09 RX ORDER — POTASSIUM CHLORIDE 7.45 MG/ML
10 INJECTION INTRAVENOUS PRN
Status: DISCONTINUED | OUTPATIENT
Start: 2019-09-09 | End: 2019-09-12 | Stop reason: HOSPADM

## 2019-09-09 RX ORDER — ONDANSETRON 2 MG/ML
4 INJECTION INTRAMUSCULAR; INTRAVENOUS EVERY 6 HOURS PRN
Status: DISCONTINUED | OUTPATIENT
Start: 2019-09-09 | End: 2019-09-12 | Stop reason: HOSPADM

## 2019-09-09 RX ORDER — ONDANSETRON 2 MG/ML
8 INJECTION INTRAMUSCULAR; INTRAVENOUS ONCE
Status: COMPLETED | OUTPATIENT
Start: 2019-09-09 | End: 2019-09-09

## 2019-09-09 RX ORDER — POTASSIUM CHLORIDE 20 MEQ/1
40 TABLET, EXTENDED RELEASE ORAL PRN
Status: DISCONTINUED | OUTPATIENT
Start: 2019-09-09 | End: 2019-09-12 | Stop reason: HOSPADM

## 2019-09-09 RX ORDER — HYDROCODONE BITARTRATE AND ACETAMINOPHEN 5; 325 MG/1; MG/1
2 TABLET ORAL EVERY 4 HOURS PRN
Status: DISCONTINUED | OUTPATIENT
Start: 2019-09-09 | End: 2019-09-09

## 2019-09-09 RX ORDER — MAGNESIUM SULFATE 1 G/100ML
1 INJECTION INTRAVENOUS PRN
Status: DISCONTINUED | OUTPATIENT
Start: 2019-09-09 | End: 2019-09-12 | Stop reason: HOSPADM

## 2019-09-09 RX ORDER — ONDANSETRON 4 MG/1
4 TABLET, ORALLY DISINTEGRATING ORAL EVERY 6 HOURS PRN
Status: DISCONTINUED | OUTPATIENT
Start: 2019-09-09 | End: 2019-09-12 | Stop reason: HOSPADM

## 2019-09-09 RX ORDER — AMLODIPINE BESYLATE 2.5 MG/1
2.5 TABLET ORAL DAILY
Status: DISCONTINUED | OUTPATIENT
Start: 2019-09-09 | End: 2019-09-12 | Stop reason: HOSPADM

## 2019-09-09 RX ORDER — FUROSEMIDE 10 MG/ML
20 INJECTION INTRAMUSCULAR; INTRAVENOUS ONCE
Status: COMPLETED | OUTPATIENT
Start: 2019-09-09 | End: 2019-09-09

## 2019-09-09 RX ORDER — ACETAMINOPHEN 325 MG/1
650 TABLET ORAL EVERY 4 HOURS PRN
Status: DISCONTINUED | OUTPATIENT
Start: 2019-09-09 | End: 2019-09-12 | Stop reason: HOSPADM

## 2019-09-09 RX ORDER — SODIUM CHLORIDE 0.9 % (FLUSH) 0.9 %
10 SYRINGE (ML) INJECTION EVERY 12 HOURS SCHEDULED
Status: DISCONTINUED | OUTPATIENT
Start: 2019-09-09 | End: 2019-09-09 | Stop reason: SDUPTHER

## 2019-09-09 RX ORDER — HYDROCODONE BITARTRATE AND ACETAMINOPHEN 5; 325 MG/1; MG/1
2 TABLET ORAL EVERY 6 HOURS PRN
Status: DISCONTINUED | OUTPATIENT
Start: 2019-09-09 | End: 2019-09-12 | Stop reason: HOSPADM

## 2019-09-09 RX ORDER — SODIUM CHLORIDE 0.9 % (FLUSH) 0.9 %
10 SYRINGE (ML) INJECTION PRN
Status: DISCONTINUED | OUTPATIENT
Start: 2019-09-09 | End: 2019-09-12 | Stop reason: HOSPADM

## 2019-09-09 RX ADMIN — CALCIUM ACETATE 667 MG: 667 CAPSULE ORAL at 16:14

## 2019-09-09 RX ADMIN — FUROSEMIDE 20 MG: 10 INJECTION, SOLUTION INTRAMUSCULAR; INTRAVENOUS at 13:55

## 2019-09-09 RX ADMIN — METHYLPREDNISOLONE SODIUM SUCCINATE 60 MG: 125 INJECTION, POWDER, FOR SOLUTION INTRAMUSCULAR; INTRAVENOUS at 17:38

## 2019-09-09 RX ADMIN — FAMOTIDINE 20 MG: 20 TABLET ORAL at 20:00

## 2019-09-09 RX ADMIN — METHYLPREDNISOLONE SODIUM SUCCINATE 60 MG: 125 INJECTION, POWDER, FOR SOLUTION INTRAMUSCULAR; INTRAVENOUS at 13:55

## 2019-09-09 RX ADMIN — MORPHINE SULFATE 2 MG: 2 INJECTION, SOLUTION INTRAMUSCULAR; INTRAVENOUS at 11:15

## 2019-09-09 RX ADMIN — FAMOTIDINE 20 MG: 20 TABLET ORAL at 10:15

## 2019-09-09 RX ADMIN — ONDANSETRON 8 MG: 2 INJECTION INTRAMUSCULAR; INTRAVENOUS at 02:03

## 2019-09-09 RX ADMIN — AMITRIPTYLINE HYDROCHLORIDE 75 MG: 50 TABLET, FILM COATED ORAL at 20:00

## 2019-09-09 RX ADMIN — HYDROMORPHONE HYDROCHLORIDE 1 MG: 1 INJECTION, SOLUTION INTRAMUSCULAR; INTRAVENOUS; SUBCUTANEOUS at 05:53

## 2019-09-09 RX ADMIN — SODIUM CHLORIDE: 9 INJECTION, SOLUTION INTRAVENOUS at 12:00

## 2019-09-09 RX ADMIN — HYDROMORPHONE HYDROCHLORIDE 0.5 MG: 1 INJECTION, SOLUTION INTRAMUSCULAR; INTRAVENOUS; SUBCUTANEOUS at 02:12

## 2019-09-09 RX ADMIN — HYDROCODONE BITARTRATE AND ACETAMINOPHEN 2 TABLET: 5; 325 TABLET ORAL at 16:15

## 2019-09-09 RX ADMIN — HYDROMORPHONE HYDROCHLORIDE 1 MG: 1 INJECTION, SOLUTION INTRAMUSCULAR; INTRAVENOUS; SUBCUTANEOUS at 04:00

## 2019-09-09 RX ADMIN — AMLODIPINE BESYLATE 2.5 MG: 2.5 TABLET ORAL at 10:14

## 2019-09-09 RX ADMIN — MORPHINE SULFATE 2 MG: 2 INJECTION, SOLUTION INTRAMUSCULAR; INTRAVENOUS at 17:37

## 2019-09-09 RX ADMIN — HYDROCODONE BITARTRATE AND ACETAMINOPHEN 2 TABLET: 5; 325 TABLET ORAL at 10:15

## 2019-09-09 RX ADMIN — HYDROMORPHONE HYDROCHLORIDE 0.5 MG: 1 INJECTION, SOLUTION INTRAMUSCULAR; INTRAVENOUS; SUBCUTANEOUS at 02:03

## 2019-09-09 RX ADMIN — Medication 10 ML: at 20:00

## 2019-09-09 ASSESSMENT — PAIN DESCRIPTION - LOCATION
LOCATION: BACK
LOCATION: BACK

## 2019-09-09 ASSESSMENT — PAIN SCALES - GENERAL
PAINLEVEL_OUTOF10: 7
PAINLEVEL_OUTOF10: 6
PAINLEVEL_OUTOF10: 8
PAINLEVEL_OUTOF10: 6
PAINLEVEL_OUTOF10: 10
PAINLEVEL_OUTOF10: 10
PAINLEVEL_OUTOF10: 8
PAINLEVEL_OUTOF10: 5
PAINLEVEL_OUTOF10: 6
PAINLEVEL_OUTOF10: 5
PAINLEVEL_OUTOF10: 7
PAINLEVEL_OUTOF10: 9

## 2019-09-09 ASSESSMENT — PAIN DESCRIPTION - PAIN TYPE
TYPE: ACUTE PAIN
TYPE: ACUTE PAIN

## 2019-09-09 NOTE — CONSULTS
Medications  Medications Prior to Admission: cephALEXin (KEFLEX) 500 MG capsule, Take 1 capsule by mouth 2 times daily for 7 days  amLODIPine (NORVASC) 2.5 MG tablet, Take 1 tablet by mouth daily  warfarin (COUMADIN) 10 MG tablet, Take 1 tablet by mouth daily as directed by [facility].  vitamin B-12 (CYANOCOBALAMIN) 1000 MCG tablet, Take 1,000 mcg by mouth daily  calcium acetate (PHOSLO) 667 MG capsule, Take 1 capsule by mouth 3 times daily (with meals) (Patient taking differently: Take 667 mg by mouth 2 times daily (with meals) )  amitriptyline (ELAVIL) 75 MG tablet, Take 75 mg by mouth nightly     Allergies    Patient has no known allergies.   Social History     Social History     Socioeconomic History    Marital status:      Spouse name: Not on file    Number of children: Not on file    Years of education: Not on file    Highest education level: Not on file   Occupational History    Not on file   Social Needs    Financial resource strain: Not on file    Food insecurity:     Worry: Not on file     Inability: Not on file    Transportation needs:     Medical: Not on file     Non-medical: Not on file   Tobacco Use    Smoking status: Never Smoker    Smokeless tobacco: Never Used   Substance and Sexual Activity    Alcohol use: No    Drug use: No    Sexual activity: Not on file   Lifestyle    Physical activity:     Days per week: Not on file     Minutes per session: Not on file    Stress: Not on file   Relationships    Social connections:     Talks on phone: Not on file     Gets together: Not on file     Attends Mandaen service: Not on file     Active member of club or organization: Not on file     Attends meetings of clubs or organizations: Not on file     Relationship status: Not on file    Intimate partner violence:     Fear of current or ex partner: Not on file     Emotionally abused: Not on file     Physically abused: Not on file     Forced sexual activity: Not on 09/09/2019    HGB 13.0 09/09/2019    HCT 39.2 09/09/2019     09/09/2019    MCV 83.9 09/09/2019    MCH 27.8 09/09/2019    MCHC 33.2 09/09/2019    RDW 15.4 09/09/2019    LYMPHOPCT 30 09/09/2019    MONOPCT 4 09/09/2019    BASOPCT 2 09/09/2019    MONOSABS 0.46 09/09/2019    LYMPHSABS 3.42 09/09/2019    EOSABS 0.00 09/09/2019    BASOSABS 0.23 09/09/2019    DIFFTYPE NOT REPORTED 09/09/2019     BMP   Lab Results   Component Value Date     09/09/2019    K 4.3 09/09/2019     09/09/2019    CO2 18 09/09/2019    BUN 19 09/09/2019    CREATININE 1.46 09/09/2019    GLUCOSE 131 09/09/2019    CALCIUM 8.3 09/09/2019    MG 1.6 09/05/2019     LFTS  Lab Results   Component Value Date    ALKPHOS 104 09/04/2019    ALT 51 09/04/2019    AST 67 09/04/2019    PROT 7.0 09/04/2019    BILITOT 0.32 09/04/2019    BILIDIR 0.10 11/29/2016    IBILI 0.24 11/29/2016    LABALBU 2.9 09/04/2019     PTT  Lab Results   Component Value Date    APTT 37.9 (H) 09/09/2019     INR   Lab Results   Component Value Date    INR 3.4 09/09/2019    INR 2.9 09/05/2019    INR 2.1 09/04/2019    PROTIME 33.8 (H) 09/09/2019    PROTIME 28.9 (H) 09/05/2019    PROTIME 20.8 (H) 09/04/2019       Radiology    CXR   9/9/19      CT Scans  9/9/19      (See actual reports for details)    \"Thank you for asking us to see this patient\"    Case discussed with nurse and patient. Questions and concerns addressed.     Electronically signed by     ARLINE Zuniga CNP on 9/9/2019 at 12:12 PM  Patient was seen under the supervision of Dr. Meera Guallpa and Sleep Medicine,    [facility] [place]: [phone]

## 2019-09-09 NOTE — CARE COORDINATION
[facility] patient (Renal failure- )          2019    Patient: Jones Andrade Patient : 1950   MRN: [id number]  Reason for Admission: intractable pain (back), dyspnea, abnormal CT  RARS: Readmission Risk Score: 15         Spoke with: Seamus Estrada    Met with pt in his room. Pt in current BPCI transition (READMISSION).    Pt states plan is home independently again   Will continue to follow       Readmission Risk  Patient Active Problem List   Diagnosis    Dyspnea    Morbid obesity with BMI of 60.0-69.9, adult ([address]    Elevated brain natriuretic peptide (BNP) level    Dyspnea on exertion    DVT (deep venous thrombosis) (HCC)    Acute respiratory failure with hypoxia (HCC)    Respiratory acidosis    Cor pulmonale (HCC)    Gross hematuria    Atelectasis of left lower lobe    Pneumonia    MRSA (methicillin resistant staph aureus) culture positive    STEPH (obstructive sleep apnea)    Oral bleeding    MRSA (methicillin resistant Staphylococcus aureus)    Morbid obesity with BMI of 60.0-69.9, adult (HCC)    Tibial DVT (deep venous thrombosis) (HCC)    Respiratory failure (HCC)    Toxic metabolic encephalopathy    Critical illness myopathy    Critical illness neuropathy (HCC)    Polyneuropathy    Persistent cough    Sacral decubitus ulcer    Arm vein blood clot    Ureteral calculus, right    Ureteral calculus, right    Hydronephrosis, right    Leukocytosis    Right kidney stone    Tachycardia    Elevated serum creatinine    Obstructed, uropathy    Atrial fibrillation with RVR (HCC)    Pulmonary hypertension (HCC)    Urinary tract infection with hematuria    Atypical atrial flutter (HCC)    Bandemia    C. difficile diarrhea    Bradycardia    Ureteral calculus, right    ZACH [facility]) [facility]    Chronic abdominal pain    Intractable pain       Inpatient Assessment  Care Transitions Summary    Care Transitions

## 2019-09-09 NOTE — PROGRESS NOTES
Pharmacy Note - Warfarin Pharmacy to Dose Consult    Max Aldrich is a 70 y.o. male for whom pharmacy has been consulted to manage inpatient warfarin therapy. Consulting Physician: Vivian  Reason for Admission: back pain    Warfarin dose prior to admission: 10mg daily  Warfarin indication: DVT  Target INR range: 2-3     Past Medical History:   Diagnosis Date    Acute kidney failure (HCC)     Arthritis     Atelectasis, left 08/10/2014    LEFT LOWER LOBE    Atrial fibrillation ([address]     9/2016    Blood circulation, collateral     ismael filter for prior DVT in right leg, right arm    Cor pulmonale ([address] 08/02/2014    DVT (deep vein thrombosis) in pregnancy ([address] 07/30/2014    Dyspnea 07/29/2014    Elevated brain natriuretic peptide (BNP) level 07/29/2014    Burson filter in place     leg - groin    Gross hematuria 0805/2014    History of blood clots     Hx of blood clots     left leg, right arm    Hypertension     Kidney disease     is currently being worked up for kidney disease    Kidney stone on right side     Morbid obesity ([address]     MRSA (methicillin resistant staph aureus) culture positive 8/11/2014    sputum    MRSA (methicillin resistant staph aureus) culture positive     Neuropathy     left foot    STEPH (obstructive sleep apnea)     PAF (paroxysmal atrial fibrillation) ([address] 09/22/2017    Pneumonia     Pneumonia     Polyneuropathy 8/23/2014    Pulmonary hypertension ([address] 09/22/2017    Respiratory acidosis 07/31/2014    Respiratory failure ([address] 2014    had trach placed then removed.     Respiratory failure ([address]     Sleep apnea     CPAP                Recent Labs     09/09/19  0158   INR 3.4     Recent Labs     09/09/19 0158   HGB 13.0   HCT 39.2*            Recent Labs     09/09/19  0158   HCT 39.2*            Current warfarin drug-drug interactions: Solumedrol  Active orders for other anticoagulants: none      Date             INR        Dose   9/9/2019            3.4 hold      Plan: Hold warfarin for INR > 3.0. Daily PT/INR is ordered while inpatient. Thank you for the consult. Will continue to follow.   Harish Ramires  9/9/2019  12:56 PM

## 2019-09-09 NOTE — H&P
°C)          General appearance - alert, well appearing, and in no acute distress  Mental status - oriented to person, place, and time with normal affect  Head - normocephalic and atraumatic  Eyes - pupils equal and reactive, extraocular eye movements intact, conjunctiva clear  Ears - hearing appears to be intact  Nose - no drainage noted  Mouth - mucous membranes moist  Neck - supple, no carotid bruits, thyroid not palpable  Chest - clear to auscultation, normal effort  Heart - normal rate, regular rhythm, no murmur  Abdomen - soft, nontender, nondistended, bowel sounds present all four quadrants, no masses, hepatomegaly or splenomegaly  Neurological - normal speech, no focal findings or movement disorder noted, cranial nerves II through XII grossly intact  Extremities -paraspinal tenderness, limited ROM   skin - no gross lesions, rashes, or induration noted        Data:     Labs:    Hematology:  Recent Labs     09/09/19  0158 09/09/19  1250   WBC 11.4*  --    RBC 4.67  --    HGB 13.0  --    HCT 39.2*  --    MCV 83.9  --    MCH 27.8  --    MCHC 33.2  --    RDW 15.4*  --      --    MPV 9.8  --    INR 3.4  --    DDIMER  --  0.96     Chemistry:  Recent Labs     09/09/19  0158   *   K 4.3      CO2 18*   GLUCOSE 131*   BUN 19   CREATININE 1.46*   ANIONGAP 12   LABGLOM 48*   GFRAA 58*   CALCIUM 8.3*   PROBNP 1,857*   TROPHS 20   CKTOTAL 345*   CKMB 5.9   MYOGLOBIN 207*     No results for input(s): PROT, LABALBU, LABA1C, L3XILCJ, M4SLRBI, FT4, TSH, AST, ALT, LDH, GGT, ALKPHOS, LABGGT, BILITOT, BILIDIR, AMMONIA, AMYLASE, LIPASE, LACTATE, CHOL, HDL, LDLCHOLESTEROL, CHOLHDLRATIO, TRIG, VLDL, MFX64HM, PHENYTOIN, PHENYF, URICACID, POCGLU in the last 72 hours.     Lab Results   Component Value Date    INR 3.4 09/09/2019    INR 2.9 09/05/2019    INR 2.1 09/04/2019    PROTIME 33.8 (H) 09/09/2019    PROTIME 28.9 (H) 09/05/2019    PROTIME 20.8 (H) 09/04/2019       Lab Results   Component Value Date/Time    SPECIAL

## 2019-09-10 ENCOUNTER — APPOINTMENT (OUTPATIENT)
Dept: GENERAL RADIOLOGY | Age: 69
DRG: 542 | End: 2019-09-10
Payer: MEDICARE

## 2019-09-10 ENCOUNTER — APPOINTMENT (OUTPATIENT)
Dept: CT IMAGING | Age: 69
DRG: 542 | End: 2019-09-10
Payer: MEDICARE

## 2019-09-10 ENCOUNTER — APPOINTMENT (OUTPATIENT)
Dept: NUCLEAR MEDICINE | Age: 69
DRG: 542 | End: 2019-09-10
Payer: MEDICARE

## 2019-09-10 PROBLEM — M51.36 LUMBAR DEGENERATIVE DISC DISEASE: Status: ACTIVE | Noted: 2019-09-10

## 2019-09-10 LAB
ABSOLUTE EOS #: 0 K/UL (ref 0–0.44)
ABSOLUTE IMMATURE GRANULOCYTE: 0.18 K/UL (ref 0–0.3)
ABSOLUTE LYMPH #: 4.05 K/UL (ref 1.1–3.7)
ABSOLUTE MONO #: 0.18 K/UL (ref 0.1–1.2)
ANION GAP SERPL CALCULATED.3IONS-SCNC: 10 MMOL/L (ref 9–17)
BASOPHILS # BLD: 0 % (ref 0–2)
BASOPHILS ABSOLUTE: 0 K/UL (ref 0–0.2)
BNP INTERPRETATION: ABNORMAL
BUN BLDV-MCNC: 24 MG/DL (ref 8–23)
BUN/CREAT BLD: 14 (ref 9–20)
CALCIUM SERPL-MCNC: 8.3 MG/DL (ref 8.6–10.4)
CHLORIDE BLD-SCNC: 103 MMOL/L (ref 98–107)
CO2: 23 MMOL/L (ref 20–31)
CREAT SERPL-MCNC: 1.66 MG/DL (ref 0.7–1.2)
DIFFERENTIAL TYPE: ABNORMAL
EOSINOPHILS RELATIVE PERCENT: 0 % (ref 1–4)
GFR AFRICAN AMERICAN: 50 ML/MIN
GFR NON-AFRICAN AMERICAN: 41 ML/MIN
GFR SERPL CREATININE-BSD FRML MDRD: ABNORMAL ML/MIN/{1.73_M2}
GFR SERPL CREATININE-BSD FRML MDRD: ABNORMAL ML/MIN/{1.73_M2}
GLUCOSE BLD-MCNC: 189 MG/DL (ref 70–99)
HCT VFR BLD CALC: 38.4 % (ref 40.7–50.3)
HEMOGLOBIN: 12.3 G/DL (ref 13–17)
IMMATURE GRANULOCYTES: 2 %
INR BLD: 3.8
LV EF: 75 %
LVEF MODALITY: NORMAL
LYMPHOCYTES # BLD: 44 % (ref 24–43)
MCH RBC QN AUTO: 27.6 PG (ref 25.2–33.5)
MCHC RBC AUTO-ENTMCNC: 32 G/DL (ref 28.4–34.8)
MCV RBC AUTO: 86.3 FL (ref 82.6–102.9)
MONOCYTES # BLD: 2 % (ref 3–12)
NRBC AUTOMATED: 0 PER 100 WBC
PDW BLD-RTO: 15.9 % (ref 11.8–14.4)
PLATELET # BLD: 179 K/UL (ref 138–453)
PLATELET ESTIMATE: ABNORMAL
PMV BLD AUTO: 10.3 FL (ref 8.1–13.5)
POTASSIUM SERPL-SCNC: 4.9 MMOL/L (ref 3.7–5.3)
PRO-BNP: 938 PG/ML
PROTHROMBIN TIME: 37.5 SEC (ref 9.7–11.6)
RBC # BLD: 4.45 M/UL (ref 4.21–5.77)
RBC # BLD: ABNORMAL 10*6/UL
SEG NEUTROPHILS: 52 % (ref 36–65)
SEGMENTED NEUTROPHILS ABSOLUTE COUNT: 4.79 K/UL (ref 1.5–8.1)
SODIUM BLD-SCNC: 136 MMOL/L (ref 135–144)
WBC # BLD: 9.2 K/UL (ref 3.5–11.3)
WBC # BLD: ABNORMAL 10*3/UL

## 2019-09-10 PROCEDURE — 6370000000 HC RX 637 (ALT 250 FOR IP): Performed by: HOSPITALIST

## 2019-09-10 PROCEDURE — 73521 X-RAY EXAM HIPS BI 2 VIEWS: CPT

## 2019-09-10 PROCEDURE — A9540 TC99M MAA: HCPCS | Performed by: NURSE PRACTITIONER

## 2019-09-10 PROCEDURE — 6360000002 HC RX W HCPCS: Performed by: NURSE PRACTITIONER

## 2019-09-10 PROCEDURE — 72072 X-RAY EXAM THORAC SPINE 3VWS: CPT

## 2019-09-10 PROCEDURE — 93306 TTE W/DOPPLER COMPLETE: CPT

## 2019-09-10 PROCEDURE — 72100 X-RAY EXAM L-S SPINE 2/3 VWS: CPT

## 2019-09-10 PROCEDURE — A9538 TC99M PYROPHOSPHATE: HCPCS | Performed by: NURSE PRACTITIONER

## 2019-09-10 PROCEDURE — 83880 ASSAY OF NATRIURETIC PEPTIDE: CPT

## 2019-09-10 PROCEDURE — 2580000003 HC RX 258: Performed by: FAMILY MEDICINE

## 2019-09-10 PROCEDURE — 3430000000 HC RX DIAGNOSTIC RADIOPHARMACEUTICAL: Performed by: NURSE PRACTITIONER

## 2019-09-10 PROCEDURE — 36415 COLL VENOUS BLD VENIPUNCTURE: CPT

## 2019-09-10 PROCEDURE — 85025 COMPLETE CBC W/AUTO DIFF WBC: CPT

## 2019-09-10 PROCEDURE — 85610 PROTHROMBIN TIME: CPT

## 2019-09-10 PROCEDURE — 72131 CT LUMBAR SPINE W/O DYE: CPT

## 2019-09-10 PROCEDURE — 80048 BASIC METABOLIC PNL TOTAL CA: CPT

## 2019-09-10 PROCEDURE — 6370000000 HC RX 637 (ALT 250 FOR IP): Performed by: FAMILY MEDICINE

## 2019-09-10 PROCEDURE — 72192 CT PELVIS W/O DYE: CPT

## 2019-09-10 PROCEDURE — 1200000000 HC SEMI PRIVATE

## 2019-09-10 PROCEDURE — 78582 LUNG VENTILAT&PERFUS IMAGING: CPT

## 2019-09-10 RX ADMIN — FAMOTIDINE 20 MG: 20 TABLET ORAL at 21:39

## 2019-09-10 RX ADMIN — AMLODIPINE BESYLATE 2.5 MG: 2.5 TABLET ORAL at 09:13

## 2019-09-10 RX ADMIN — Medication 9.8 MILLICURIE: at 10:25

## 2019-09-10 RX ADMIN — HYDROCODONE BITARTRATE AND ACETAMINOPHEN 2 TABLET: 5; 325 TABLET ORAL at 16:42

## 2019-09-10 RX ADMIN — HYDROCODONE BITARTRATE AND ACETAMINOPHEN 2 TABLET: 5; 325 TABLET ORAL at 22:44

## 2019-09-10 RX ADMIN — CALCIUM ACETATE 667 MG: 667 CAPSULE ORAL at 09:13

## 2019-09-10 RX ADMIN — FAMOTIDINE 20 MG: 20 TABLET ORAL at 09:13

## 2019-09-10 RX ADMIN — METHYLPREDNISOLONE SODIUM SUCCINATE 60 MG: 125 INJECTION, POWDER, FOR SOLUTION INTRAMUSCULAR; INTRAVENOUS at 00:44

## 2019-09-10 RX ADMIN — Medication 10 ML: at 21:39

## 2019-09-10 RX ADMIN — HYDROCODONE BITARTRATE AND ACETAMINOPHEN 2 TABLET: 5; 325 TABLET ORAL at 09:13

## 2019-09-10 RX ADMIN — Medication 10 ML: at 09:13

## 2019-09-10 RX ADMIN — Medication 37.9 MILLICURIE: at 10:05

## 2019-09-10 RX ADMIN — CALCIUM ACETATE 667 MG: 667 CAPSULE ORAL at 16:42

## 2019-09-10 RX ADMIN — AMITRIPTYLINE HYDROCHLORIDE 75 MG: 50 TABLET, FILM COATED ORAL at 21:38

## 2019-09-10 ASSESSMENT — PAIN SCALES - GENERAL
PAINLEVEL_OUTOF10: 3

## 2019-09-10 NOTE — CONSULTS
resistant staph aureus) culture positive 8/11/2014    sputum    MRSA (methicillin resistant staph aureus) culture positive     Neuropathy     left foot    STEPH (obstructive sleep apnea)     PAF (paroxysmal [facility] fibrillation) ([facility] 09/22/2017    Pneumonia     Pneumonia     Polyneuropathy 8/23/2014    Pulmonary hypertension ([facility] 09/22/2017    Respiratory acidosis 07/31/2014    Respiratory failure ([facility] 2014    had trach placed then removed.     Respiratory failure ([facility]     Sleep apnea     CPAP       Past Surgical History:    Past Surgical History:   Procedure Laterality Date    ABLATION OF DYSRHYTHMIC FOCUS  12/12/2016    CARDIAC CATHETERIZATION      CHOLECYSTECTOMY      COLONOSCOPY      CYSTOSCOPY Right 09/13/2016    right retrograde pyelogram , right urethral stent    CYSTOSCOPY  11/16/2016    with stent exchange of right ureter    ENDOSCOPY, COLON, DIAGNOSTIC      HC  PICC POWERPIC TRIPLE  08/18/2014    no longer present    LITHOTRIPSY  11/16/2016    Holium laser lithotripsy     TRACHEOSTOMY      closed     URETER STENT PLACEMENT Right     VASCULAR SURGERY      ismael filter implant       Medications Prior to Admission:   Current Facility-Administered Medications   Medication Dose Route Frequency Provider Last Rate Last Dose    morphine (PF) injection 2 mg  2 mg Intravenous Q4H PRN Carol Dacosta MD   2 mg at 09/09/19 1737    HYDROcodone-acetaminophen (NORCO) 5-325 MG per tablet 2 tablet  2 tablet Oral Q6H PRN Carol Dacosta MD   2 tablet at 09/09/19 1615    amitriptyline (ELAVIL) tablet 75 mg  75 mg Oral Nightly Jordan Park MD   75 mg at 09/09/19 2000    amLODIPine (NORVASC) tablet 2.5 mg  2.5 mg Oral Daily Jordan Park MD   2.5 mg at 09/09/19 1014    sodium chloride flush 0.9 % injection 10 mL  10 mL Intravenous 2 times per day Carol Dacosta MD   10 mL at 09/09/19 2000    sodium chloride flush 0.9 % injection 10 mL  10 mL Intravenous PRN Jordan Finley

## 2019-09-10 NOTE — PROGRESS NOTES
injury with rhabdomyolysis resented to ER due to intractable low back pain. Patient also been complaining of shortness of breath. Denies any chest pain. He denies any nausea or vomiting. Patient had a CT chest which showed left lower lobe pulmonary nodule on 1.1 cm with mediastinal lymphadenopathy. Patient is a non-smoker. He also noted to have sodium of 131. His creatinine is 1.4. His WBC is 11.4. His BNP is 1857, troponin is 20. Patient has had echocardiogram in 2016 which showed a mildly reduced EF. He complain of low back pain rated as 8-10/10, paraspinal area. He denies any changes in urination or bowel habit. I have personally reviewed the past medical history, past surgical history, medications, social history, and family history, and summarized in the note. Review of Systems:     All 10 point system is reviewed and negative otherwise mentioned in HPI.       Past Medical History:     Past Medical History:   Diagnosis Date    Acute kidney failure ([address]     Arthritis     Atelectasis, left 08/10/2014    LEFT LOWER LOBE    Atrial fibrillation ([address]     9/2016    Blood circulation, collateral     ismael filter for prior DVT in right leg, right arm    Cor pulmonale ([address] 08/02/2014    DVT (deep vein thrombosis) in pregnancy ([address] 07/30/2014    Dyspnea 07/29/2014    Elevated brain natriuretic peptide (BNP) level 07/29/2014    Ismael filter in place     leg - groin    Gross hematuria 0805/2014    History of blood clots     Hx of blood clots     left leg, right arm    Hypertension     Kidney disease     is currently being worked up for kidney disease    Kidney stone on right side     Morbid obesity ([address]     MRSA (methicillin resistant staph aureus) culture positive 8/11/2014    sputum    MRSA (methicillin resistant staph aureus) culture positive     Neuropathy     left foot    STEPH (obstructive sleep apnea)     PAF (paroxysmal atrial fibrillation) ([address] 09/22/2017    Pneumonia     Pneumonia     Polyneuropathy 8/23/2014    Pulmonary hypertension ([facility] 09/22/2017    Respiratory acidosis 07/31/2014    Respiratory failure ([facility] 2014    had trach placed then removed.  Respiratory failure ([facility]     Sleep apnea     CPAP        Past Surgical History:     Past Surgical History:   Procedure Laterality Date    ABLATION OF DYSRHYTHMIC FOCUS  12/12/2016    CARDIAC CATHETERIZATION      CHOLECYSTECTOMY      COLONOSCOPY      CYSTOSCOPY Right 09/13/2016    right retrograde pyelogram , right urethral stent    CYSTOSCOPY  11/16/2016    with stent exchange of right ureter    ENDOSCOPY, COLON, DIAGNOSTIC      HC  PICC POWERPIC TRIPLE  08/18/2014    no longer present    LITHOTRIPSY  11/16/2016    Holium laser lithotripsy     TRACHEOSTOMY      closed     URETER STENT PLACEMENT Right     VASCULAR SURGERY      ismael filter implant        Medications Prior to Admission:       Prior to Admission medications    Medication Sig Start Date End Date Taking? Authorizing Provider   calcium acetate (PHOSLO) 667 MG capsule Take 667 mg by mouth 2 times daily (with meals)    Historical Provider, MD   cephALEXin (KEFLEX) 500 MG capsule Take 1 capsule by mouth 2 times daily for 7 days 9/6/19 9/13/19  Gregoria Yan MD   amLODIPine (NORVASC) 2.5 MG tablet Take 1 tablet by mouth daily 9/5/19   Gregoria Yan MD   warfarin (COUMADIN) 10 MG tablet Take 1 tablet by mouth daily as directed by [address] [place] [address]. 8/13/19   Marvel Fuentes MD   vitamin B-12 (CYANOCOBALAMIN) 1000 MCG tablet Take 1,000 mcg by mouth daily    Historical Provider, MD   amitriptyline (ELAVIL) 75 MG tablet Take 75 mg by mouth nightly     Historical Provider, MD        Allergies:       Patient has no known allergies. Social History:     Tobacco:    reports that he has never smoked. He has never used smokeless tobacco.  Alcohol:      reports that he does not drink alcohol.   Drug Use:  reports that

## 2019-09-10 NOTE — PROGRESS NOTES
Physical Therapy  DATE: 9/10/2019    NAME: Shiv Ornelas  MRN: [id number]   : 1950    Patient not seen this date for Physical Therapy due to:  [] Blood transfusion in progress  [] Hemodialysis  []  Patient Declined  [] Spine Precautions   [] Strict Bedrest  [] Surgery/ Procedure  [x] Testing RN states pt. Has multiple tests today and it will be difficult to find time to evaluate. Can check back later today or tomorrow      [] Other        [] PT being discontinued at this time. Patient independent. No further needs. [] PT being discontinued at this time as the patient has been transferred to palliative care. No further needs.     Sirena Evans, PT

## 2019-09-10 NOTE — PLAN OF CARE
Problem: Pain:  Goal: Pain level will decrease  Description  Pain level will decrease  Outcome: Ongoing     Problem: Risk for Impaired Skin Integrity  Goal: Tissue integrity - skin and mucous membranes  Description  Structural intactness and normal physiological function of skin and  mucous membranes.   Outcome: Ongoing     Problem: Falls - Risk of:  Goal: Will remain free from falls  Description  Will remain free from falls  Outcome: Ongoing

## 2019-09-11 ENCOUNTER — APPOINTMENT (OUTPATIENT)
Dept: NUCLEAR MEDICINE | Age: 69
DRG: 542 | End: 2019-09-11
Payer: MEDICARE

## 2019-09-11 PROBLEM — M80.08XA PATHOLOGICAL FRACTURE OF SACRAL VERTEBRA DUE TO OSTEOPOROSIS (HCC): Status: ACTIVE | Noted: 2019-09-11

## 2019-09-11 LAB
ABSOLUTE EOS #: 0 K/UL (ref 0–0.4)
ABSOLUTE IMMATURE GRANULOCYTE: 0 K/UL (ref 0–0.3)
ABSOLUTE LYMPH #: 2.32 K/UL (ref 1–4.8)
ABSOLUTE MONO #: 1.39 K/UL (ref 0.2–0.8)
ANION GAP SERPL CALCULATED.3IONS-SCNC: 8 MMOL/L (ref 9–17)
ATYPICAL LYMPHOCYTE ABSOLUTE COUNT: 0.7 K/UL
ATYPICAL LYMPHOCYTES: 6 %
BASOPHILS # BLD: 0 %
BASOPHILS ABSOLUTE: 0 K/UL (ref 0–0.2)
BNP INTERPRETATION: ABNORMAL
BUN BLDV-MCNC: 35 MG/DL (ref 8–23)
BUN/CREAT BLD: 20 (ref 9–20)
CALCIUM SERPL-MCNC: 7.9 MG/DL (ref 8.6–10.4)
CHLORIDE BLD-SCNC: 105 MMOL/L (ref 98–107)
CO2: 25 MMOL/L (ref 20–31)
CREAT SERPL-MCNC: 1.78 MG/DL (ref 0.7–1.2)
DIFFERENTIAL TYPE: ABNORMAL
EOSINOPHILS RELATIVE PERCENT: 0 % (ref 1–4)
GFR AFRICAN AMERICAN: 46 ML/MIN
GFR NON-AFRICAN AMERICAN: 38 ML/MIN
GFR SERPL CREATININE-BSD FRML MDRD: ABNORMAL ML/MIN/{1.73_M2}
GFR SERPL CREATININE-BSD FRML MDRD: ABNORMAL ML/MIN/{1.73_M2}
GLUCOSE BLD-MCNC: 109 MG/DL (ref 70–99)
HCT VFR BLD CALC: 36.8 % (ref 40.7–50.3)
HEMOGLOBIN: 11.5 G/DL (ref 13–17)
IMMATURE GRANULOCYTES: 0 %
INR BLD: 3.2
LYMPHOCYTES # BLD: 20 % (ref 24–44)
MCH RBC QN AUTO: 27.6 PG (ref 25.2–33.5)
MCHC RBC AUTO-ENTMCNC: 31.3 G/DL (ref 28.4–34.8)
MCV RBC AUTO: 88.2 FL (ref 82.6–102.9)
MONOCYTES # BLD: 12 % (ref 1–7)
NRBC AUTOMATED: 0 PER 100 WBC
PDW BLD-RTO: 16.4 % (ref 11.8–14.4)
PLATELET # BLD: 181 K/UL (ref 138–453)
PLATELET ESTIMATE: ABNORMAL
PMV BLD AUTO: 10 FL (ref 8.1–13.5)
POTASSIUM SERPL-SCNC: 4.3 MMOL/L (ref 3.7–5.3)
PRO-BNP: 512 PG/ML
PROTHROMBIN TIME: 31.2 SEC (ref 9.7–11.6)
RBC # BLD: 4.17 M/UL (ref 4.21–5.77)
RBC # BLD: ABNORMAL 10*6/UL
SEG NEUTROPHILS: 62 % (ref 36–66)
SEGMENTED NEUTROPHILS ABSOLUTE COUNT: 7.19 K/UL (ref 1.8–7.7)
SODIUM BLD-SCNC: 138 MMOL/L (ref 135–144)
WBC # BLD: 11.6 K/UL (ref 3.5–11.3)
WBC # BLD: ABNORMAL 10*3/UL

## 2019-09-11 PROCEDURE — 97535 SELF CARE MNGMENT TRAINING: CPT

## 2019-09-11 PROCEDURE — 36415 COLL VENOUS BLD VENIPUNCTURE: CPT

## 2019-09-11 PROCEDURE — 97530 THERAPEUTIC ACTIVITIES: CPT

## 2019-09-11 PROCEDURE — 97116 GAIT TRAINING THERAPY: CPT

## 2019-09-11 PROCEDURE — 2580000003 HC RX 258: Performed by: FAMILY MEDICINE

## 2019-09-11 PROCEDURE — 80048 BASIC METABOLIC PNL TOTAL CA: CPT

## 2019-09-11 PROCEDURE — 78306 BONE IMAGING WHOLE BODY: CPT

## 2019-09-11 PROCEDURE — 1200000000 HC SEMI PRIVATE

## 2019-09-11 PROCEDURE — 97166 OT EVAL MOD COMPLEX 45 MIN: CPT

## 2019-09-11 PROCEDURE — 83880 ASSAY OF NATRIURETIC PEPTIDE: CPT

## 2019-09-11 PROCEDURE — 3430000000 HC RX DIAGNOSTIC RADIOPHARMACEUTICAL: Performed by: ORTHOPAEDIC SURGERY

## 2019-09-11 PROCEDURE — 97162 PT EVAL MOD COMPLEX 30 MIN: CPT

## 2019-09-11 PROCEDURE — 6360000002 HC RX W HCPCS: Performed by: FAMILY MEDICINE

## 2019-09-11 PROCEDURE — 85610 PROTHROMBIN TIME: CPT

## 2019-09-11 PROCEDURE — 97110 THERAPEUTIC EXERCISES: CPT

## 2019-09-11 PROCEDURE — 6370000000 HC RX 637 (ALT 250 FOR IP): Performed by: HOSPITALIST

## 2019-09-11 PROCEDURE — 85025 COMPLETE CBC W/AUTO DIFF WBC: CPT

## 2019-09-11 PROCEDURE — 6370000000 HC RX 637 (ALT 250 FOR IP): Performed by: FAMILY MEDICINE

## 2019-09-11 PROCEDURE — A9503 TC99M MEDRONATE: HCPCS | Performed by: ORTHOPAEDIC SURGERY

## 2019-09-11 RX ORDER — TC 99M MEDRONATE 20 MG/10ML
25 INJECTION, POWDER, LYOPHILIZED, FOR SOLUTION INTRAVENOUS
Status: COMPLETED | OUTPATIENT
Start: 2019-09-11 | End: 2019-09-11

## 2019-09-11 RX ADMIN — CALCIUM ACETATE 667 MG: 667 CAPSULE ORAL at 08:57

## 2019-09-11 RX ADMIN — Medication 10 ML: at 09:02

## 2019-09-11 RX ADMIN — HYDROCODONE BITARTRATE AND ACETAMINOPHEN 2 TABLET: 5; 325 TABLET ORAL at 20:42

## 2019-09-11 RX ADMIN — FAMOTIDINE 20 MG: 20 TABLET ORAL at 20:42

## 2019-09-11 RX ADMIN — HYDROCODONE BITARTRATE AND ACETAMINOPHEN 2 TABLET: 5; 325 TABLET ORAL at 13:25

## 2019-09-11 RX ADMIN — CALCIUM ACETATE 667 MG: 667 CAPSULE ORAL at 16:48

## 2019-09-11 RX ADMIN — TC 99M MEDRONATE 27 MILLICURIE: 20 INJECTION, POWDER, LYOPHILIZED, FOR SOLUTION INTRAVENOUS at 10:03

## 2019-09-11 RX ADMIN — AMLODIPINE BESYLATE 2.5 MG: 2.5 TABLET ORAL at 08:57

## 2019-09-11 RX ADMIN — HYDROCODONE BITARTRATE AND ACETAMINOPHEN 2 TABLET: 5; 325 TABLET ORAL at 06:27

## 2019-09-11 RX ADMIN — MORPHINE SULFATE 2 MG: 2 INJECTION, SOLUTION INTRAMUSCULAR; INTRAVENOUS at 09:11

## 2019-09-11 RX ADMIN — AMITRIPTYLINE HYDROCHLORIDE 75 MG: 50 TABLET, FILM COATED ORAL at 20:42

## 2019-09-11 RX ADMIN — FAMOTIDINE 20 MG: 20 TABLET ORAL at 08:57

## 2019-09-11 ASSESSMENT — PAIN DESCRIPTION - PAIN TYPE
TYPE: CHRONIC PAIN;ACUTE PAIN
TYPE: ACUTE PAIN
TYPE: ACUTE PAIN

## 2019-09-11 ASSESSMENT — PAIN SCALES - GENERAL
PAINLEVEL_OUTOF10: 0
PAINLEVEL_OUTOF10: 10
PAINLEVEL_OUTOF10: 3
PAINLEVEL_OUTOF10: 6
PAINLEVEL_OUTOF10: 9
PAINLEVEL_OUTOF10: 5
PAINLEVEL_OUTOF10: 4
PAINLEVEL_OUTOF10: 0
PAINLEVEL_OUTOF10: 4

## 2019-09-11 ASSESSMENT — PAIN DESCRIPTION - LOCATION
LOCATION: BACK

## 2019-09-11 NOTE — PROGRESS NOTES
All fall risk precautions in place, Call light within reach, Gait belt, Left in chair, Nurse notified    G-Code       OutComes Score                                                  AM-PAC Score  AM-PAC Inpatient Mobility Raw Score : 22 (09/11/19 1022)  AM-PAC Inpatient T-Scale Score : 53.28 (09/11/19 1022)  Mobility Inpatient CMS 0-100% Score: 20.91 (09/11/19 1022)  Mobility Inpatient CMS G-Code Modifier : Tami Washingtonbens (09/11/19 1022)          Goals  Short term goals  Time Frame for Short term goals: 12 visits  Short term goal 1: Patient will be indep with transfers. Short term goal 2: Patient will amb 100 feet indep with RW. Short term goal 3: Patient will have good- standing balance. Short term goal 4: Patient will tolerate 30 minutes ther-ex and ther-act. Short term goal 5: Patient will be indep with HEP.   Patient Goals   Patient goals : Return home, walk       Therapy Time   Individual Concurrent Group Co-treatment   Time In 0909         Time Out 0955         Minutes 46         Timed Code Treatment Minutes: [address] [facility] [address], PT

## 2019-09-11 NOTE — PROGRESS NOTES
[facility] Cardiology:    Attempted to see patient and he is down for his bone scan. Discussed with RN. Will attempt to see patient later.     Suzie Tuttle, ARLINE-CNP

## 2019-09-11 NOTE — PROGRESS NOTES
progress note  [facility].,    Adult Hospitalist      Name: Polina Ortega  MRN: [id number]     Acct: [de-identified]  Room: 3853/3014-13    Admit Date: 9/9/2019  1:47 AM  PCP: Margarita Davila MD    Primary Problem  Active Problems: Morbid obesity with BMI of 60.0-69.9, adult (HCC)    Polyneuropathy    Intractable pain    Lumbar degenerative disc disease    Pathological fracture of sacral vertebra due to osteoporosis [facility])  Resolved Problems:    * No resolved hospital problems. *        Assesment:     · Pathological osteoporotic a right sacral Ala zone 1 fracture  · Intractable back pain secondary to above  · Chronic systolic congestive heart failure  · 1.1 cm pulmonary nodule  · Mediastinal lymphadenopathy  · Hyponatremia  · Recent rhabdomyolysis-improving  · Atrial fibrillation  · Essential hypertension  · History of iron deficiency anemia  · History of DVT status post IVC filter  · Morbid obesity        Plan:     · Admit to MedSur telemetry  · Oxygen to maintain oxygen saturation greater than 92%  · Narcotics for pain control  · Orthopedic consult  · IV Lasix x 1 received  · Repeat echocardiogram, pending  · Cardiology consult  · Continue Norvasc and Elavil  · Monitor creatinine and sodium  · Pulmonology consult, outpatient follow-up with repeat CT  · DVT and GI prophylaxis.   · Appreciate Orthopedic assistance,  Patient will go for whole-body bone scan today  · Discharge planning for tomorrow for home with home health care, to follow up with pulmonology and Orthopedic as an outpatient, dependent on results for bone scan later today      Chief Complaint:     Chief Complaint   Patient presents with    Back Pain    Arm Pain     Right    Shortness of Breath     due to pain from back          History of Present Illness:      Polina Ortega is a 70 y.o.  male who presents with Back Pain; Arm Pain (Right); and Shortness of Breath (due to pain from back )   patient seen examined at bedside patient amitriptyline (ELAVIL) 75 MG tablet Take 75 mg by mouth nightly     Historical Provider, MD        Allergies:       Patient has no known allergies. Social History:     Tobacco:    reports that he has never smoked. He has never used smokeless tobacco.  Alcohol:      reports that he does not drink alcohol. Drug Use:  reports that he does not use drugs.     Family History:     Family History   Problem Relation Age of Onset    Cancer Mother     Heart Disease Father          Physical Exam:     Vitals:  BP (!) 142/75   Pulse 60   Temp 97.5 °F (36.4 °C) (Oral)   Resp 16   Ht 5' 9\" (1.753 m)   Wt (!) 413 lb (187.3 kg)   SpO2 97%   BMI 60.99 kg/m²   Temp (24hrs), Av °F (36.7 °C), Min:97.5 °F (36.4 °C), Max:98.6 °F (37 °C)          General appearance - alert, well appearing, and in no acute distress  Mental status - oriented to person, place, and time with normal affect  Head - normocephalic and atraumatic  Eyes - pupils equal and reactive, extraocular eye movements intact, conjunctiva clear  Ears - hearing appears to be intact  Nose - no drainage noted  Mouth - mucous membranes moist  Neck - supple, no carotid bruits, thyroid not palpable  Chest - clear to auscultation, normal effort  Heart - normal rate, regular rhythm, no murmur  Abdomen - soft, nontender, nondistended, bowel sounds present all four quadrants, no masses, hepatomegaly or splenomegaly  Neurological - normal speech, no focal findings or movement disorder noted, cranial nerves II through XII grossly intact  Extremities -paraspinal tenderness, limited ROM   skin - no gross lesions, rashes, or induration noted        Data:     Labs:    Hematology:  Recent Labs     19  0158 19  1250 09/10/19  0514 19  0629   WBC 11.4*  --  9.2 11.6*   RBC 4.67  --  4.45 4.17*   HGB 13.0  --  12.3* 11.5*   HCT 39.2*  --  38.4* 36.8*   MCV 83.9  --  86.3 88.2   MCH 27.8  --  27.6 27.6   MCHC 33.2  --  32.0 31.3   RDW 15.4*  --  15.9* 16.4*   PLT 177  --  179 181   MPV 9.8  --  10.3 10.0   INR 3.4  --  3.8 3.2   DDIMER  --  0.96  --   --      Chemistry:  Recent Labs     09/09/19  0158 09/10/19  0514 09/11/19  0629   * 136 138   K 4.3 4.9 4.3    103 105   CO2 18* 23 25   GLUCOSE 131* 189* 109*   BUN 19 24* 35*   CREATININE 1.46* 1.66* 1.78*   ANIONGAP 12 10 8*   LABGLOM 48* 41* 38*   GFRAA 58* 50* 46*   CALCIUM 8.3* 8.3* 7.9*   PROBNP 1,857* 938* 512*   TROPHS 20  --   --    CKTOTAL 345*  --   --    CKMB 5.9  --   --    MYOGLOBIN 207*  --   --      No results for input(s): PROT, LABALBU, LABA1C, V6CLXFX, N5QDLAU, FT4, TSH, AST, ALT, LDH, GGT, ALKPHOS, LABGGT, BILITOT, BILIDIR, AMMONIA, AMYLASE, LIPASE, LACTATE, CHOL, HDL, LDLCHOLESTEROL, CHOLHDLRATIO, TRIG, VLDL, JCD73LW, PHENYTOIN, PHENYF, URICACID, POCGLU in the last 72 hours. Lab Results   Component Value Date    INR 3.2 09/11/2019    INR 3.8 09/10/2019    INR 3.4 09/09/2019    PROTIME 31.2 (H) 09/11/2019    PROTIME 37.5 (H) 09/10/2019    PROTIME 33.8 (H) 09/09/2019       Lab Results   Component Value Date/Time    SPECIAL NOT REPORTED 09/05/2019 10:44 AM     Lab Results   Component Value Date/Time    CULTURE NO GROWTH 09/05/2019 10:44 AM       Lab Results   Component Value Date    POCPH 7.47 11/22/2016    POCPCO2 32 11/22/2016    POCPO2 79 11/22/2016    POCHCO3 23.2 11/22/2016    NBEA 1 11/22/2016    PBEA NOT REPORTED 11/22/2016    IRC2RCH 24 11/22/2016    HBHA5KYR 96 11/22/2016    FIO2 35.0 11/22/2016       Radiology:    Ct Chest Wo Contrast    Result Date: 9/9/2019  Left lower lobe superior segment 1.1 cm pulmonary nodule with somewhat hazy margins and punctate eccentric calcification. This nodule raises concern for primary lung malignancy. PET-CT may be helpful for further evaluation. Multiple prominent mediastinal and left hilar lymph nodes raise concern for malignant involvement. Continued attention on follow-up recommended.      Xr Chest Portable    Result Date:

## 2019-09-11 NOTE — PROGRESS NOTES
Physical Therapy  Facility/Department: [facility]  Daily Treatment Note  NAME: Todd Shaw  : 1950  MRN: [id number]    Date of Service: 2019    Discharge Recommendations:  Outpatient PT        Assessment   Body structures, Functions, Activity limitations: Decreased functional mobility ; Decreased endurance;Decreased balance  Assessment: Patient steady with RW, recommend home with OP PT. Activity Tolerance  Activity Tolerance: Patient Tolerated treatment well     Patient Diagnosis(es): The primary encounter diagnosis was Intractable pain. Diagnoses of Dyspnea, unspecified type and Abnormal CT of the chest were also pertinent to this visit. has a past medical history of Acute kidney failure ([address] Arthritis, Atelectasis, left, Atrial fibrillation ([address] Blood circulation, collateral, Cor pulmonale ([address] DVT (deep vein thrombosis) in pregnancy ([address] Dyspnea, Elevated brain natriuretic peptide (BNP) level, Martin filter in place, Gross hematuria, History of blood clots, Hx of blood clots, Hypertension, Kidney disease, Kidney stone on right side, Morbid obesity ([address] MRSA (methicillin resistant staph aureus) culture positive, MRSA (methicillin resistant staph aureus) culture positive, Neuropathy, STEPH (obstructive sleep apnea), PAF (paroxysmal atrial fibrillation) ([address] Pneumonia, Pneumonia, Polyneuropathy, Pulmonary hypertension ([address] Respiratory acidosis, Respiratory failure ([address] Respiratory failure ([address] and Sleep apnea. has a past surgical history that includes Cholecystectomy;   picc powerpic triple (2014); vascular surgery; Cystocopy (Right, 2016); Ureter stent placement (Right); Cystoscopy (2016); Lithotripsy (2016); Cardiac catheterization; tracheostomy; Colonoscopy; Endoscopy, colon, diagnostic; and ablation of dysrhythmic focus (2016).     Restrictions  Restrictions/Precautions  Restrictions/Precautions: General Precautions, Weight Bearing, Up as Tolerated  Lower Extremity Weight Bearing Restrictions  Right Lower Extremity Weight Bearing: Weight Bearing As Tolerated  Left Lower Extremity Weight Bearing: Weight Bearing As Tolerated  Position Activity Restriction  Other position/activity restrictions: up with assist  Subjective   General  Chart Reviewed: Yes  Additional Pertinent Hx: Recent admission with decreased kidney function, Several years ago had respiratory failure with vent / trach, trach now closed. Pain Screening  Patient Currently in Pain: Denies  Pain Assessment  Pain Assessment: 0-10  Vital Signs  Patient Currently in Pain: Denies  Pre Treatment Pain Screening  Intervention List: Patient able to continue with treatment    Orientation     Cognition      Objective   Bed mobility  Scooting: Stand by assistance  Transfers  Sit to Stand: Stand by assistance  Stand to sit: Stand by assistance  Stand Pivot Transfers: Stand by assistance  Ambulation  Ambulation?: Yes  Ambulation 1  Surface: level tile  Device: Rolling Walker  Assistance: Contact guard assistance  Quality of Gait: Patient steady with RW  Gait Deviations: Increased JORGE A  Distance: 115 ft  Comments: pt requires verbal cues for pacing himself     Balance  Posture: Good  Sitting - Static: Good  Sitting - Dynamic: Fair  Standing - Static: Fair  Exercises  Comments: standing ex x 15 reps         Comment: assisted pt to bedside chair briefly for seated ther ex              G-Code     OutComes Score                                                     AM-PAC Score  AM-PAC Inpatient Mobility Raw Score : 22 (09/11/19 1626)  AM-PAC Inpatient T-Scale Score : 53.28 (09/11/19 1626)  Mobility Inpatient CMS 0-100% Score: 20.91 (09/11/19 1626)  Mobility Inpatient CMS G-Code Modifier : CJ (09/11/19 1626)          Goals  Short term goals  Time Frame for Short term goals: 12 visits  Short term goal 1: Patient will be indep with transfers.   Short term goal 2: Patient will amb 100 feet indep with

## 2019-09-11 NOTE — PROGRESS NOTES
the last few months, slid off bed a few months ago, recently legs gave out when he was sick. Objective   Vision: Within Functional Limits  Hearing: Within functional limits    Orientation  Overall Orientation Status: Within Normal Limits  Observation/Palpation  Posture: Good  Observation: morbidly obese  Balance  Sitting Balance: Independent  Standing Balance: Stand by assistance  Functional Mobility  Functional - Mobility Device: Rolling Walker  Activity: To/from bathroom  Assist Level: Stand by assistance  Functional Mobility Comments: pt demo'd functional mob in room and to/from bathroom for toilet transfer. Pt cued on slowing pace down and staying close to walker. Pt verb understanding. Pt is steady on feet  Toilet Transfers  Equipment Used: Standard toilet  Toilet Transfer: Stand by assistance  ADL  Feeding: Independent  Grooming: Supervision(in bathroom)  UE Bathing: Stand by assistance  LE Bathing: Stand by assistance  UE Dressing: Stand by assistance  LE Dressing: Stand by assistance  Toileting: Supervision  Additional Comments: pt is easily SOB and body size makes all tasks difficult, but pt has adapted and is able to compensate accordingly. Pt has proficient routine at home vs different set up in hospital room.  Pt is educated on safety and slowing down to complete some tasks and during functional mob  Tone RUE  RUE Tone: Normotonic  Tone LUE  LUE Tone: Normotonic  Coordination  Movements Are Fluid And Coordinated: Yes        Transfers  Sit to stand: Stand by assistance  Stand to sit: Stand by assistance     Cognition  Overall Cognitive Status: WNL  Perception  Overall Perceptual Status: WFL     Sensation  Overall Sensation Status: Impaired(BLE neuropathy)        LUE AROM (degrees)  LUE AROM : WFL  RUE AROM (degrees)  RUE AROM : WFL  LUE Strength  Gross LUE Strength: WFL  LUE Strength Comment: MARTY Bruce 5/5  RUE Strength  Gross RUE Strength: WFL                   Plan   Plan  Times per week: 4x/week  Current Treatment Recommendations: Strengthening, Balance Training, Functional Mobility Training, Endurance Training, Equipment Evaluation, Education, & procurement, Patient/Caregiver Education & Training, Self-Care / ADL, Safety Education & Training            Goals  Short term goals  Time Frame for Short term goals: by discharge, pt will  Short term goal 1: demo S/MI with ADL transfers and functional mob with approp AD and DME  Short term goal 2: demo S/MI with UB/LB ADL routine with DME and AE as needed wiht good pacing  Short term goal 3: demo and verb good understanding of fall prevention techs, EC/WS techs, and possible equip needs for home  Patient Goals   Patient goals : to go home       Therapy Time   Individual Concurrent Group Co-treatment   Time In 0918(+9 min chart review)         Time Out 0935         Minutes [address] [place], [place]

## 2019-09-11 NOTE — PROGRESS NOTES
control  · Ortho on consult/Full body scan   · Labs: CBC and BMP in am - monitor renal function   · DVT prophylaxis, on Coumadin, therapeutic INR  · Recommend PET scan out patient  · Incentive spirometry every hour while awake  · Discharge planning ok from pulmonary standopoint  · Will follow with you    Electronically signed by     ARLINE Trent - CNP on 9/11/2019 at 3:57 PM  Patient was seen under the supervision of Dr. Caio Black and Sleep Medicine,    [facility] [place]: [phone]

## 2019-09-12 VITALS
BODY MASS INDEX: 46.65 KG/M2 | HEIGHT: 69 IN | OXYGEN SATURATION: 97 % | DIASTOLIC BLOOD PRESSURE: 63 MMHG | HEART RATE: 72 BPM | SYSTOLIC BLOOD PRESSURE: 130 MMHG | RESPIRATION RATE: 18 BRPM | TEMPERATURE: 97.9 F | WEIGHT: 315 LBS

## 2019-09-12 LAB
ABSOLUTE EOS #: 0 K/UL (ref 0–0.4)
ABSOLUTE IMMATURE GRANULOCYTE: 0 K/UL (ref 0–0.3)
ABSOLUTE LYMPH #: 4.28 K/UL (ref 1–4.8)
ABSOLUTE MONO #: 0.56 K/UL (ref 0.2–0.8)
ANION GAP SERPL CALCULATED.3IONS-SCNC: 10 MMOL/L (ref 9–17)
ATYPICAL LYMPHOCYTE ABSOLUTE COUNT: 0.37 K/UL
ATYPICAL LYMPHOCYTES: 4 %
BASOPHILS # BLD: 0 %
BASOPHILS ABSOLUTE: 0 K/UL (ref 0–0.2)
BNP INTERPRETATION: ABNORMAL
BUN BLDV-MCNC: 33 MG/DL (ref 8–23)
BUN/CREAT BLD: 20 (ref 9–20)
CALCIUM SERPL-MCNC: 8.3 MG/DL (ref 8.6–10.4)
CHLORIDE BLD-SCNC: 103 MMOL/L (ref 98–107)
CO2: 24 MMOL/L (ref 20–31)
CREAT SERPL-MCNC: 1.68 MG/DL (ref 0.7–1.2)
DIFFERENTIAL TYPE: ABNORMAL
EOSINOPHILS RELATIVE PERCENT: 0 % (ref 1–4)
GFR AFRICAN AMERICAN: 49 ML/MIN
GFR NON-AFRICAN AMERICAN: 41 ML/MIN
GFR SERPL CREATININE-BSD FRML MDRD: ABNORMAL ML/MIN/{1.73_M2}
GFR SERPL CREATININE-BSD FRML MDRD: ABNORMAL ML/MIN/{1.73_M2}
GLUCOSE BLD-MCNC: 103 MG/DL (ref 70–99)
HCT VFR BLD CALC: 37.6 % (ref 40.7–50.3)
HEMOGLOBIN: 11.5 G/DL (ref 13–17)
IMMATURE GRANULOCYTES: 0 %
INR BLD: 1.9
LYMPHOCYTES # BLD: 46 % (ref 24–44)
MCH RBC QN AUTO: 27.3 PG (ref 25.2–33.5)
MCHC RBC AUTO-ENTMCNC: 30.6 G/DL (ref 28.4–34.8)
MCV RBC AUTO: 89.3 FL (ref 82.6–102.9)
MONOCYTES # BLD: 6 % (ref 1–7)
NRBC AUTOMATED: 0 PER 100 WBC
PDW BLD-RTO: 16.4 % (ref 11.8–14.4)
PLATELET # BLD: 166 K/UL (ref 138–453)
PLATELET ESTIMATE: ABNORMAL
PMV BLD AUTO: 9.7 FL (ref 8.1–13.5)
POTASSIUM SERPL-SCNC: 4.3 MMOL/L (ref 3.7–5.3)
PRO-BNP: 416 PG/ML
PROTHROMBIN TIME: 18.6 SEC (ref 9.7–11.6)
RBC # BLD: 4.21 M/UL (ref 4.21–5.77)
RBC # BLD: ABNORMAL 10*6/UL
SEG NEUTROPHILS: 44 % (ref 36–66)
SEGMENTED NEUTROPHILS ABSOLUTE COUNT: 4.09 K/UL (ref 1.8–7.7)
SODIUM BLD-SCNC: 137 MMOL/L (ref 135–144)
WBC # BLD: 9.3 K/UL (ref 3.5–11.3)
WBC # BLD: ABNORMAL 10*3/UL

## 2019-09-12 PROCEDURE — 97530 THERAPEUTIC ACTIVITIES: CPT

## 2019-09-12 PROCEDURE — 6370000000 HC RX 637 (ALT 250 FOR IP): Performed by: FAMILY MEDICINE

## 2019-09-12 PROCEDURE — 6370000000 HC RX 637 (ALT 250 FOR IP): Performed by: HOSPITALIST

## 2019-09-12 PROCEDURE — 85025 COMPLETE CBC W/AUTO DIFF WBC: CPT

## 2019-09-12 PROCEDURE — 6370000000 HC RX 637 (ALT 250 FOR IP): Performed by: ORTHOPAEDIC SURGERY

## 2019-09-12 PROCEDURE — 85610 PROTHROMBIN TIME: CPT

## 2019-09-12 PROCEDURE — 80048 BASIC METABOLIC PNL TOTAL CA: CPT

## 2019-09-12 PROCEDURE — 36415 COLL VENOUS BLD VENIPUNCTURE: CPT

## 2019-09-12 PROCEDURE — 83880 ASSAY OF NATRIURETIC PEPTIDE: CPT

## 2019-09-12 PROCEDURE — 6360000002 HC RX W HCPCS: Performed by: FAMILY MEDICINE

## 2019-09-12 RX ORDER — WARFARIN SODIUM 10 MG/1
10 TABLET ORAL DAILY
Status: DISCONTINUED | OUTPATIENT
Start: 2019-09-12 | End: 2019-09-12 | Stop reason: HOSPADM

## 2019-09-12 RX ORDER — OXYCODONE HYDROCHLORIDE AND ACETAMINOPHEN 5; 325 MG/1; MG/1
2 TABLET ORAL EVERY 6 HOURS PRN
Qty: 30 TABLET | Refills: 0
Start: 2019-09-12 | End: 2019-09-26

## 2019-09-12 RX ORDER — OXYCODONE HYDROCHLORIDE AND ACETAMINOPHEN 5; 325 MG/1; MG/1
2 TABLET ORAL EVERY 6 HOURS PRN
Status: DISCONTINUED | OUTPATIENT
Start: 2019-09-12 | End: 2019-09-12 | Stop reason: HOSPADM

## 2019-09-12 RX ADMIN — FAMOTIDINE 20 MG: 20 TABLET ORAL at 08:26

## 2019-09-12 RX ADMIN — HYDROCODONE BITARTRATE AND ACETAMINOPHEN 2 TABLET: 5; 325 TABLET ORAL at 03:13

## 2019-09-12 RX ADMIN — AMLODIPINE BESYLATE 2.5 MG: 2.5 TABLET ORAL at 08:27

## 2019-09-12 RX ADMIN — MORPHINE SULFATE 2 MG: 2 INJECTION, SOLUTION INTRAMUSCULAR; INTRAVENOUS at 00:03

## 2019-09-12 RX ADMIN — OXYCODONE AND ACETAMINOPHEN 2 TABLET: 5; 325 TABLET ORAL at 08:26

## 2019-09-12 RX ADMIN — CALCIUM ACETATE 667 MG: 667 CAPSULE ORAL at 08:26

## 2019-09-12 ASSESSMENT — PAIN SCALES - GENERAL
PAINLEVEL_OUTOF10: 6

## 2019-09-12 NOTE — DISCHARGE SUMMARY
09/12/2019    INR 3.2 09/11/2019    INR 3.8 09/10/2019    PROTIME 18.6 (H) 09/12/2019    PROTIME 31.2 (H) 09/11/2019    PROTIME 37.5 (H) 09/10/2019     Lab Results   Component Value Date/Time    SPECIAL NOT REPORTED 09/05/2019 10:44 AM     Lab Results   Component Value Date/Time    CULTURE NO GROWTH 09/05/2019 10:44 AM       Lab Results   Component Value Date    POCPH 7.47 11/22/2016    POCPCO2 32 11/22/2016    POCPO2 79 11/22/2016    POCHCO3 23.2 11/22/2016    NBEA 1 11/22/2016    PBEA NOT REPORTED 11/22/2016    INS2VPI 24 11/22/2016    NFST3ZIJ 96 11/22/2016    FIO2 35.0 11/22/2016       Radiology:    Xr Thoracic Spine (3 Views)    Result Date: 9/10/2019  Multilevel degenerative disc disease and degenerative facet hypertrophy without acute abnormality. Xr Lumbar Spine (2-3 Views)    Result Date: 9/10/2019  Multilevel degenerative facet hypertrophy and degenerative disc disease most pronounced at the L4-5 level. Xr Hip Bilateral W Ap Pelvis (2 Views)    Result Date: 9/10/2019  No acute abnormality hip joints. Ct Chest Wo Contrast    Result Date: 9/9/2019  Left lower lobe superior segment 1.1 cm pulmonary nodule with somewhat hazy margins and punctate eccentric calcification. This nodule raises concern for primary lung malignancy. PET-CT may be helpful for further evaluation. Multiple prominent mediastinal and left hilar lymph nodes raise concern for malignant involvement. Continued attention on follow-up recommended. Ct Lumbar Spine Wo Contrast    Result Date: 9/12/2019  Subtle irregularity of the right sacral ala suspicious of nondisplaced fracture which could be confirmed with MRI. Mild widening of the right SI joint compared with the left. Lumbar vertebral body heights are maintained. Ct Pelvis Wo Contrast Additional Contrast? None    Result Date: 9/12/2019  Suspected acute nondisplaced right sacral alar fracture extending into the right SI joint.  Mild widening of the right SI joint and

## 2019-09-12 NOTE — PROGRESS NOTES
Progress Note    9/12/2019 8:03 AM  Subjective:   Admit Date: 9/9/2019  PCP: Liyah Camacho MD  Date of Discharge: Per hospitalist    Medications:   Scheduled Meds:   amitriptyline  75 mg Oral Nightly    amLODIPine  2.5 mg Oral Daily    sodium chloride flush  10 mL Intravenous 2 times per day    famotidine  20 mg Oral BID    warfarin (COUMADIN) daily dosing (placeholder)   Other RX Placeholder    calcium acetate  667 mg Oral BID WC     Continuous Infusions:  PRN Meds:morphine, HYDROcodone-acetaminophen, sodium chloride flush, potassium chloride **OR** potassium alternative oral replacement **OR** potassium chloride, magnesium sulfate, magnesium hydroxide, nicotine, acetaminophen, ondansetron **OR** ondansetron, albuterol    Diet:   Diet: DIET GENERAL;    Subjective:   Systemic or Specific Complaints:Pain Control    Objective:     Patient Vitals for the past 24 hrs:   BP Temp Temp src Pulse Resp SpO2 Weight   09/12/19 0800 (!) 148/79 97.5 °F (36.4 °C) Oral 70 18 97 % --   09/12/19 0557 -- -- -- -- -- -- (!) 400 lb 6.4 oz (181.6 kg)   09/12/19 0400 (!) 156/76 97.3 °F (36.3 °C) Oral 110 18 95 % --   09/12/19 0034 (!) 155/70 97.3 °F (36.3 °C) Oral 69 18 94 % --   09/11/19 2055 134/79 99.8 °F (37.7 °C) Oral 99 16 96 % --   09/11/19 1510 (!) 141/61 97.5 °F (36.4 °C) Oral 73 16 94 % --   09/11/19 1132 133/74 98.1 °F (36.7 °C) Oral 70 16 95 % --     No intake/output data recorded. No intake/output data recorded. General: alert, appears stated age, cooperative, moderate distress and morbidly obese   Wound: Motion: Painful range of Motion in affected extremity   DVT Exam: No evidence of DVT seen on physical exam.  Negative Kamila's sign. No cords or calf tenderness.      Additional exam:     Data Review  CBC:   Recent Labs     09/10/19  0514 09/11/19  0629 09/12/19  0700   WBC 9.2 11.6* 9.3   HGB 12.3* 11.5* 11.5*    181 166     BMP:    Recent Labs     09/10/19  0514 09/11/19  0629 09/12/19  0700   NA

## 2019-09-12 NOTE — PROGRESS NOTES
Physical Therapy  DATE: 2019    NAME: Shiv Ornelas  MRN: [id number]   : 1950 1104   Position Activity Restriction   Other position/activity restrictions 400lb   Subjective   Subjective Pt. states he overdid it yesterday in PT, walking all the way down the calero and back. [place] good doing it but now experiencing 4/10 pain at rest in sacrum. This on top of getting up/down to walk to bathroom pt. states was too much. Apololgetic, but not wanting to get OOB at this time. General Comment   Comments Provided pt. with edu. for various positioning in bed to relieve back pain. Had pt. demonstrate. In supine, lowered head of bed and had pt. pull himself up in bed independently. Then demonstrated how raising legs of bed can relieve pressure on low back. Discussed at home how to be done with pillows. Pt. then assumed sidelying position independently and showed him how to use pillows to prop behind his back and between his legs to offer support and better alignment while resting to relieve pain. Pt. planning on having his wife buy more pillows for home. Discussed importance of ex. within pain tolerance and various UE/LE ROM ex. Discussed energy conservation for home, doing less but more frequently to avoid overdoing it and preventing stiffness from setting in.  Pt. with good understanding, asking questions and very receptive to edu. Will atttempt to mobilize tomorrow. Orientation   Overall Orientation Status WNL   Short term goals   Time Frame for Short term goals 12 visits   Short term goal 1 Patient will be indep with transfers. Short term goal 2 Patient will amb 100 feet indep with RW. Short term goal 3 Patient will have good- standing balance. Short term goal 4 Patient will tolerate 30 minutes ther-ex and ther-act. Short term goal 5 Patient will be indep with HEP.    Conditions Requiring Skilled Therapeutic Intervention   REQUIRES PT FOLLOW UP Yes   Discharge

## 2019-09-12 NOTE — CARE COORDINATION
nHpredict Inpatient Visit    Patient/family seen: Yes. Per pt, plan is discharging home today     Provided patient/family with copy of nHpredict tool: Yes     All questions answered at the time of visit. Informed patient/family that the nHpredict is a tool, to be used as a guide, and should not alter their currently established discharge plan. Current discharge plan: home with [address] [place] [address]     Collaboration completed with case management: Yes- CYNTHIA Watts faxed predict tool to [address] [place] [address] for their records. Please find attached the nH Predict Outcome Report for Joann Deal  1950  The nH Predict Outcome Report recommends home with Home Health as does therapy. The patient is SBA/supervision for transfers, ambulation, bathing, dressing and toileting. He could benefit from continued therapy at home. Happy Hump Day.   Dina Medrano

## 2019-09-13 ENCOUNTER — CARE COORDINATION (OUTPATIENT)
Dept: CASE MANAGEMENT | Age: 69
End: 2019-09-13

## 2019-09-17 ENCOUNTER — CARE COORDINATION (OUTPATIENT)
Dept: CASE MANAGEMENT | Age: 69
End: 2019-09-17

## 2019-09-17 NOTE — CARE COORDINATION
[facility] Follow Up Call    2019    Patient: Julissa Iyer  Patient : 1950   MRN: [id number]  Reason for Admission: intractable pain (back), dyspnea, abnormal CT   Discharge Date: 19 RARS: Readmission Risk Score: 20         Spoke with: Julissa Iyer    Was able to contact Toro Guadarrama for [employer]. He stated that he was \"not bad\". His pain is a # 2. He cont to apply ice and using pain medications. He is taking is slow and easy. He will be seeing Dr Valentino Layman on   He denies constipation and the nurse took his vitals today they WNL. No concerns or questions  Encouraged him to call if need arises. Care Transitions Subsequent and Final Call    Subsequent and Final Calls  Do you have any ongoing symptoms?:  Yes  Onset of Patient-reported symptoms: In the past 7 days  Patient-reported symptoms:  Pain  Have your medications changed?:  No  Do you have any questions related to your medications?:  No  Do you currently have any active services?:  Yes  Are you currently active with any services?:  Home Health  Do you have any needs or concerns that I can assist you with?:  No  Care Transitions Interventions  Other Interventions:             Follow Up  Future Appointments   Date Time Provider Eli Bond   2019  1:30 PM Shiloh Alba DO Resp Spec Roderick Ramos RN

## 2019-09-24 ENCOUNTER — CARE COORDINATION (OUTPATIENT)
Dept: CASE MANAGEMENT | Age: 69
End: 2019-09-24

## 2019-09-24 ENCOUNTER — HOSPITAL ENCOUNTER (OUTPATIENT)
Dept: PHARMACY | Age: 69
Setting detail: THERAPIES SERIES
Discharge: HOME | End: 2019-09-24
Payer: MEDICARE

## 2019-09-24 DIAGNOSIS — I82.499 DEEP VEIN THROMBOSIS (DVT) OF OTHER VEIN OF LOWER EXTREMITY, UNSPECIFIED CHRONICITY, UNSPECIFIED LATERALITY (HCC): ICD-10-CM

## 2019-09-24 LAB
INR BLD: 3.3
PROTHROMBIN TIME: 39.8

## 2019-09-24 NOTE — CARE COORDINATION
[facility] Transitions Follow Up Call- [facility]-A patient (Renal failure- )      2019    Patient: Callie Andrade  Patient : 1950   MRN: [id number]  Reason for Admission: intractable pain (back), dyspnea, abnormal CT    Discharge Date: 19 RARS: Readmission Risk Score: 20         Spoke with: Marii Early    Call to pt who states he is doing well- pain much better controlled and has been able to participate in PT and walked quite a bit outside today with the physical therapist and is happy with his progress. States he took 2 pain pills last night and 2 again this morning but is managing much better without pain meds ATC  States he sees Dr Marco A Watson 10/7/19 now (had to change appt d/t  for his brother who was found  after some time alone). States his mother passed away today- sympathy and condolences offered and accepted  States he has seen PCP and will se Dr Praveen Horton (ortho) 19, Brooklynn Winston (Douglas Muse) 10/1/19  Denies needs  Encouraged to call writer/ CTC or providers if questions or concerns- v/u       Care Transitions Subsequent and Final Call    Subsequent and Final Calls  Do you have any ongoing symptoms?:  No  Have your medications changed?:  No  Do you have any questions related to your medications?:  No  Do you currently have any active services?:  Yes  Are you currently active with any services?:  Home Health  Do you have any needs or concerns that I can assist you with?:  No  Identified Barriers:  Lack of Education  Care Transitions Interventions  Other Interventions:             Follow Up  Future Appointments   Date Time Provider Eli Bond   10/7/2019  1:15 PM David Sage DO Resp Spec Lois Sheikh RN

## 2019-10-01 ENCOUNTER — HOSPITAL ENCOUNTER (OUTPATIENT)
Dept: PHARMACY | Age: 69
Setting detail: THERAPIES SERIES
Discharge: HOME | End: 2019-10-01
Payer: MEDICARE

## 2019-10-01 DIAGNOSIS — I82.499 DEEP VEIN THROMBOSIS (DVT) OF OTHER VEIN OF LOWER EXTREMITY, UNSPECIFIED CHRONICITY, UNSPECIFIED LATERALITY (HCC): ICD-10-CM

## 2019-10-01 LAB
INR BLD: 2.8
PROTHROMBIN TIME: 33.9

## 2019-10-07 ENCOUNTER — OFFICE VISIT (OUTPATIENT)
Dept: PULMONOLOGY | Age: 69
End: 2019-10-07
Payer: MEDICARE

## 2019-10-07 VITALS
OXYGEN SATURATION: 98 % | WEIGHT: 315 LBS | RESPIRATION RATE: 17 BRPM | BODY MASS INDEX: 45.1 KG/M2 | DIASTOLIC BLOOD PRESSURE: 66 MMHG | SYSTOLIC BLOOD PRESSURE: 116 MMHG | HEIGHT: 70 IN | HEART RATE: 80 BPM | TEMPERATURE: 97.3 F

## 2019-10-07 DIAGNOSIS — G47.33 OSA ON CPAP: Primary | ICD-10-CM

## 2019-10-07 DIAGNOSIS — I48.0 PAF (PAROXYSMAL ATRIAL FIBRILLATION) (HCC): ICD-10-CM

## 2019-10-07 DIAGNOSIS — J96.00 ACUTE RESPIRATORY FAILURE, UNSPECIFIED WHETHER WITH HYPOXIA OR HYPERCAPNIA (HCC): ICD-10-CM

## 2019-10-07 DIAGNOSIS — R91.1 ABNORMAL X-RAY OF LUNGS WITH SINGLE PULMONARY NODULE: ICD-10-CM

## 2019-10-07 DIAGNOSIS — E66.01 MORBID OBESITY DUE TO EXCESS CALORIES (HCC): ICD-10-CM

## 2019-10-07 DIAGNOSIS — Z99.89 OSA ON CPAP: Primary | ICD-10-CM

## 2019-10-07 DIAGNOSIS — I27.20 PULMONARY HYPERTENSION (HCC): ICD-10-CM

## 2019-10-07 PROCEDURE — 1123F ACP DISCUSS/DSCN MKR DOCD: CPT | Performed by: INTERNAL MEDICINE

## 2019-10-07 PROCEDURE — 3017F COLORECTAL CA SCREEN DOC REV: CPT | Performed by: INTERNAL MEDICINE

## 2019-10-07 PROCEDURE — 99214 OFFICE O/P EST MOD 30 MIN: CPT | Performed by: INTERNAL MEDICINE

## 2019-10-07 PROCEDURE — G8427 DOCREV CUR MEDS BY ELIG CLIN: HCPCS | Performed by: INTERNAL MEDICINE

## 2019-10-07 PROCEDURE — 1036F TOBACCO NON-USER: CPT | Performed by: INTERNAL MEDICINE

## 2019-10-07 PROCEDURE — 4040F PNEUMOC VAC/ADMIN/RCVD: CPT | Performed by: INTERNAL MEDICINE

## 2019-10-07 PROCEDURE — 1111F DSCHRG MED/CURRENT MED MERGE: CPT | Performed by: INTERNAL MEDICINE

## 2019-10-07 PROCEDURE — G8484 FLU IMMUNIZE NO ADMIN: HCPCS | Performed by: INTERNAL MEDICINE

## 2019-10-07 PROCEDURE — G8417 CALC BMI ABV UP PARAM F/U: HCPCS | Performed by: INTERNAL MEDICINE

## 2019-10-07 RX ORDER — OXYCODONE HYDROCHLORIDE AND ACETAMINOPHEN 5; 325 MG/1; MG/1
1 TABLET ORAL EVERY 4 HOURS PRN
COMMUNITY
End: 2020-05-26 | Stop reason: ALTCHOICE

## 2019-10-07 ASSESSMENT — SLEEP AND FATIGUE QUESTIONNAIRES
ESS TOTAL SCORE: 5
HOW LIKELY ARE YOU TO NOD OFF OR FALL ASLEEP WHILE SITTING AND READING: 0
HOW LIKELY ARE YOU TO NOD OFF OR FALL ASLEEP WHILE SITTING QUIETLY AFTER LUNCH WITHOUT ALCOHOL: 3
HOW LIKELY ARE YOU TO NOD OFF OR FALL ASLEEP WHILE WATCHING TV: 2
HOW LIKELY ARE YOU TO NOD OFF OR FALL ASLEEP WHILE SITTING AND TALKING TO SOMEONE: 0
HOW LIKELY ARE YOU TO NOD OFF OR FALL ASLEEP IN A CAR, WHILE STOPPED FOR A FEW MINUTES IN TRAFFIC: 0
HOW LIKELY ARE YOU TO NOD OFF OR FALL ASLEEP WHEN YOU ARE A PASSENGER IN A CAR FOR AN HOUR WITHOUT A BREAK: 0
HOW LIKELY ARE YOU TO NOD OFF OR FALL ASLEEP WHILE SITTING INACTIVE IN A PUBLIC PLACE: 0
HOW LIKELY ARE YOU TO NOD OFF OR FALL ASLEEP WHILE LYING DOWN TO REST IN THE AFTERNOON WHEN CIRCUMSTANCES PERMIT: 0

## 2019-10-07 ASSESSMENT — ENCOUNTER SYMPTOMS
APNEA: 1
EYES NEGATIVE: 1
BACK PAIN: 1

## 2019-10-09 ENCOUNTER — HOSPITAL ENCOUNTER (OUTPATIENT)
Dept: PHARMACY | Age: 69
Setting detail: THERAPIES SERIES
Discharge: HOME | End: 2019-10-09
Payer: MEDICARE

## 2019-10-09 DIAGNOSIS — I82.499 DEEP VEIN THROMBOSIS (DVT) OF OTHER VEIN OF LOWER EXTREMITY, UNSPECIFIED CHRONICITY, UNSPECIFIED LATERALITY (HCC): ICD-10-CM

## 2019-10-09 LAB
INR BLD: 3.1
PROTHROMBIN TIME: 34.1

## 2019-10-15 ENCOUNTER — CARE COORDINATION (OUTPATIENT)
Dept: CASE MANAGEMENT | Age: 69
End: 2019-10-15

## 2019-10-29 ENCOUNTER — HOSPITAL ENCOUNTER (OUTPATIENT)
Dept: PHARMACY | Age: 69
Setting detail: THERAPIES SERIES
Discharge: HOME | End: 2019-10-29
Payer: MEDICARE

## 2019-10-29 DIAGNOSIS — I82.499 DEEP VEIN THROMBOSIS (DVT) OF OTHER VEIN OF LOWER EXTREMITY, UNSPECIFIED CHRONICITY, UNSPECIFIED LATERALITY (HCC): ICD-10-CM

## 2019-10-29 LAB
INR BLD: 2.4
PROTHROMBIN TIME: 29

## 2019-10-29 PROCEDURE — 99211 OFF/OP EST MAY X REQ PHY/QHP: CPT

## 2019-10-29 PROCEDURE — 85610 PROTHROMBIN TIME: CPT

## 2019-11-04 ENCOUNTER — CARE COORDINATION (OUTPATIENT)
Dept: CASE MANAGEMENT | Age: 69
End: 2019-11-04

## 2019-11-18 ENCOUNTER — CARE COORDINATION (OUTPATIENT)
Dept: CASE MANAGEMENT | Age: 69
End: 2019-11-18

## 2019-11-25 ENCOUNTER — HOSPITAL ENCOUNTER (OUTPATIENT)
Dept: PHARMACY | Age: 69
Setting detail: THERAPIES SERIES
Discharge: HOME | End: 2019-11-25
Payer: MEDICARE

## 2019-11-25 DIAGNOSIS — I82.499 DEEP VEIN THROMBOSIS (DVT) OF OTHER VEIN OF LOWER EXTREMITY, UNSPECIFIED CHRONICITY, UNSPECIFIED LATERALITY (HCC): ICD-10-CM

## 2019-11-25 LAB
INR BLD: 2.4
PROTIME: 29.2 SECONDS

## 2019-11-25 PROCEDURE — 99211 OFF/OP EST MAY X REQ PHY/QHP: CPT

## 2019-11-25 PROCEDURE — 85610 PROTHROMBIN TIME: CPT

## 2019-11-26 ENCOUNTER — APPOINTMENT (OUTPATIENT)
Dept: PHARMACY | Age: 69
End: 2019-11-26
Payer: MEDICARE

## 2019-12-04 ENCOUNTER — CARE COORDINATION (OUTPATIENT)
Dept: CASE MANAGEMENT | Age: 69
End: 2019-12-04

## 2019-12-23 ENCOUNTER — HOSPITAL ENCOUNTER (OUTPATIENT)
Dept: PHARMACY | Age: 69
Setting detail: THERAPIES SERIES
Discharge: HOME | End: 2019-12-23
Payer: MEDICARE

## 2019-12-23 DIAGNOSIS — I82.499 DEEP VEIN THROMBOSIS (DVT) OF OTHER VEIN OF LOWER EXTREMITY, UNSPECIFIED CHRONICITY, UNSPECIFIED LATERALITY (HCC): ICD-10-CM

## 2019-12-23 LAB
INR BLD: 2.8
PROTIME: 33.9 SECONDS

## 2019-12-23 PROCEDURE — 99211 OFF/OP EST MAY X REQ PHY/QHP: CPT

## 2019-12-23 PROCEDURE — 85610 PROTHROMBIN TIME: CPT

## 2020-01-07 ENCOUNTER — HOSPITAL ENCOUNTER (OUTPATIENT)
Dept: CT IMAGING | Age: 70
Discharge: HOME | End: 2020-01-09
Payer: MEDICARE

## 2020-01-07 PROCEDURE — 71250 CT THORAX DX C-: CPT

## 2020-01-14 ENCOUNTER — OFFICE VISIT (OUTPATIENT)
Dept: PULMONOLOGY | Age: 70
End: 2020-01-14
Payer: MEDICARE

## 2020-01-14 VITALS
HEART RATE: 85 BPM | DIASTOLIC BLOOD PRESSURE: 68 MMHG | WEIGHT: 315 LBS | BODY MASS INDEX: 46.65 KG/M2 | HEIGHT: 69 IN | RESPIRATION RATE: 16 BRPM | SYSTOLIC BLOOD PRESSURE: 133 MMHG | OXYGEN SATURATION: 98 %

## 2020-01-14 PROCEDURE — 1123F ACP DISCUSS/DSCN MKR DOCD: CPT | Performed by: INTERNAL MEDICINE

## 2020-01-14 PROCEDURE — 4040F PNEUMOC VAC/ADMIN/RCVD: CPT | Performed by: INTERNAL MEDICINE

## 2020-01-14 PROCEDURE — 1036F TOBACCO NON-USER: CPT | Performed by: INTERNAL MEDICINE

## 2020-01-14 PROCEDURE — G8484 FLU IMMUNIZE NO ADMIN: HCPCS | Performed by: INTERNAL MEDICINE

## 2020-01-14 PROCEDURE — 99213 OFFICE O/P EST LOW 20 MIN: CPT | Performed by: INTERNAL MEDICINE

## 2020-01-14 PROCEDURE — G8427 DOCREV CUR MEDS BY ELIG CLIN: HCPCS | Performed by: INTERNAL MEDICINE

## 2020-01-14 PROCEDURE — G8417 CALC BMI ABV UP PARAM F/U: HCPCS | Performed by: INTERNAL MEDICINE

## 2020-01-14 PROCEDURE — 3017F COLORECTAL CA SCREEN DOC REV: CPT | Performed by: INTERNAL MEDICINE

## 2020-01-14 ASSESSMENT — ENCOUNTER SYMPTOMS
EYES NEGATIVE: 1
RESPIRATORY NEGATIVE: 1

## 2020-01-14 NOTE — PROGRESS NOTES
Subjective:      Patient ID: Ewa Fermin is a 78 y.o. male. HPI  Follow-up visit for left lower lobe nodule. Repeat low-dose CT scan of the chest done on 1/7/2020 reviewed and compared to the previous studies done in September. There is been dramatic decrease in the size of the previously noted 1.2CM nodule which now barely measures 0.4 cm. Irregularly-shaped. Appears to be scarlike. Patient remains on CPAP. Reports that he uses it every night for the entire night. Reports refreshing and restorative sleep. Denies excessive daytime sleepiness. No particular difficulty with the apparatus. Review of Systems   Constitutional: Negative. HENT: Negative. Eyes: Negative. Respiratory: Negative. Cardiovascular: Negative. Musculoskeletal: Positive for gait problem. All other systems reviewed and are negative. Objective:     Physical Exam  Vitals signs and nursing note reviewed. Constitutional:       Appearance: He is well-developed. He is obese. HENT:      Mouth/Throat:      Comments: Large tongue , low hanging soft palate and large uvula. Overall pharyngeal orifice moderately decreased    Eyes:      General: No scleral icterus. Conjunctiva/sclera: Conjunctivae normal.   Neck:      Musculoskeletal: Neck supple. Thyroid: No thyromegaly. Vascular: No JVD. Trachea: No tracheal deviation. Cardiovascular:      Rate and Rhythm: Normal rate and regular rhythm. Heart sounds: Normal heart sounds. No murmur. No gallop. Pulmonary:      Effort: Pulmonary effort is normal. No respiratory distress. Breath sounds: No wheezing or rales. Chest:      Chest wall: No tenderness. Abdominal:      Palpations: Abdomen is soft. Tenderness: There is no tenderness. Lymphadenopathy:      Cervical: No cervical adenopathy. Skin:     General: Skin is warm and dry. Neurological:      Mental Status: He is alert and oriented to person, place, and time. Wt Readings from Last 3 Encounters:   01/14/20 (!) 430 lb (195 kg)   10/07/19 (!) 413 lb (187.3 kg)   09/12/19 (!) 400 lb 6.4 oz (181.6 kg)          Results for orders placed or performed during the hospital encounter of 12/23/19   Protime-INR   Result Value Ref Range    INR 2.8     Protime 33.9 seconds       Assessment:         1. Abnormal x-ray of lungs with single pulmonary nodule    2. STEPH on CPAP    3. Pulmonary hypertension ([address]    4. Acute respiratory failure, unspecified whether with hypoxia or hypercapnia ([address]    5. PAF (paroxysmal atrial fibrillation) ([address]    6. Morbid obesity due to excess calories ([address]          Plan:      1. One final low-dose diagnostic CT scan of the chest in 6 months. 2. Continue CPAP. 3. Weight loss. 4. Return in 6 months.      Electronically signed by Marii Reddy DO on 1/14/2020at 9:54 AM

## 2020-01-20 ENCOUNTER — HOSPITAL ENCOUNTER (OUTPATIENT)
Dept: PHARMACY | Age: 70
Setting detail: THERAPIES SERIES
Discharge: HOME | End: 2020-01-20
Payer: MEDICARE

## 2020-01-20 LAB
INR BLD: 2.4
PROTIME: 28.9 SECONDS

## 2020-01-20 PROCEDURE — 85610 PROTHROMBIN TIME: CPT

## 2020-01-20 PROCEDURE — 99211 OFF/OP EST MAY X REQ PHY/QHP: CPT

## 2020-02-17 ENCOUNTER — HOSPITAL ENCOUNTER (OUTPATIENT)
Dept: PHARMACY | Age: 70
Setting detail: THERAPIES SERIES
Discharge: HOME | End: 2020-02-17
Payer: MEDICARE

## 2020-02-17 LAB
INR BLD: 1.6
PROTIME: 19.2 SECONDS

## 2020-02-17 PROCEDURE — 85610 PROTHROMBIN TIME: CPT

## 2020-02-17 PROCEDURE — 99212 OFFICE O/P EST SF 10 MIN: CPT

## 2020-02-17 NOTE — PROGRESS NOTES
Patient states he missed his dose on Saturday and possibly one other day. No bleeding or thromboembolic side effects noted. No significant med or dietary changes. No significant recent illness or disease state changes. PT/INR done in office per protocol. INR is 1.6 which is subtherapeutic from compliance. Warfarin regimen will be continued with 10mg today, then usual regimen of 5mg Monday and 10mg all other days. Will retest in 2 weeks. Patient understands dosing directions and information discussed. Dosing schedule and follow up appointment given to patient. Progress note routed to referring physicians office. Patient acknowledges working in consult agreement with pharmacist as referred by his/her physician.       Damaris Watkins, 2/17/2020 7:11 AM

## 2020-03-02 ENCOUNTER — HOSPITAL ENCOUNTER (OUTPATIENT)
Dept: PHARMACY | Age: 70
Setting detail: THERAPIES SERIES
Discharge: HOME | End: 2020-03-02
Payer: MEDICARE

## 2020-03-02 LAB
INR BLD: 2.4
PROTIME: 29.1 SECONDS

## 2020-03-02 PROCEDURE — 85610 PROTHROMBIN TIME: CPT

## 2020-03-02 PROCEDURE — 99211 OFF/OP EST MAY X REQ PHY/QHP: CPT

## 2020-03-02 NOTE — PROGRESS NOTES
Patient states compliant all of the time with regimen. No bleeding or thromboembolic side effects noted. No significant med or dietary changes. No significant recent illness or disease state changes. PT/INR done in office per protocol. INR is 2.4 which is therapeutic. Warfarin regimen will be continued at current dose of 5mg Monday and 10mg all other days. Will retest in 4 weeks. Patient understands dosing directions and information discussed. Dosing schedule and follow up appointment given to patient. Progress note routed to referring physicians office. Patient acknowledges working in consult agreement with pharmacist as referred by his/her physician.       Pawan Gomez, 3/2/2020 8:39 AM

## 2020-03-30 ENCOUNTER — HOSPITAL ENCOUNTER (OUTPATIENT)
Dept: PHARMACY | Age: 70
Setting detail: THERAPIES SERIES
Discharge: HOME | End: 2020-03-30
Payer: MEDICARE

## 2020-03-30 LAB
INR BLD: 2.4
PROTIME: 29 SECONDS

## 2020-03-30 PROCEDURE — 99211 OFF/OP EST MAY X REQ PHY/QHP: CPT

## 2020-03-30 PROCEDURE — 85610 PROTHROMBIN TIME: CPT

## 2020-03-30 NOTE — PROGRESS NOTES
Patient states compliant all of the time with regimen. No bleeding or thromboembolic side effects noted. No significant med or dietary changes. No significant recent illness or disease state changes. PT/INR done in outpatient lab per protocol. Standing order for PT/INR was placed to transition to remote INR monitoring given efforts to reduce the spread of COVID-19. INR is 2.4 which is therapeutic. Warfarin regimen will be continued at current dose 5mg Mon, 10mg all other days. Will retest in 4 weeks. Patient understands dosing directions and information discussed. Dosing schedule and follow up appointment given to patient. Progress note routed to referring physicians office. Patient acknowledges working in consult agreement with pharmacist as referred by his/her physician.       Deepthi Calvillo, 3/30/20 9:53 AM      CLINICAL PHARMACY CONSULT: MED RECONCILIATION/REVIEW ADDENDUM    For Pharmacy Admin Tracking Only    PHSO: No  Total # of Interventions Recommended: 0  - Maintenance Safety Lab Monitoring #: 2  Total Interventions Accepted: 0  Time Spent (min): 15

## 2020-04-23 ENCOUNTER — TELEPHONE (OUTPATIENT)
Dept: PHARMACY | Age: 70
End: 2020-04-23

## 2020-05-26 ENCOUNTER — HOSPITAL ENCOUNTER (OUTPATIENT)
Dept: PHARMACY | Age: 70
Setting detail: THERAPIES SERIES
Discharge: HOME | End: 2020-05-26
Payer: MEDICARE

## 2020-05-26 LAB
INR BLD: 2.5
PROTIME: 30 SECONDS

## 2020-05-26 PROCEDURE — 99211 OFF/OP EST MAY X REQ PHY/QHP: CPT

## 2020-05-26 PROCEDURE — 85610 PROTHROMBIN TIME: CPT

## 2020-07-06 ENCOUNTER — HOSPITAL ENCOUNTER (OUTPATIENT)
Dept: PHARMACY | Age: 70
Setting detail: THERAPIES SERIES
Discharge: HOME | End: 2020-07-06
Payer: MEDICARE

## 2020-07-06 VITALS — TEMPERATURE: 97.9 F

## 2020-07-06 LAB
INR BLD: 2.5
PROTIME: 29.9 SECONDS

## 2020-07-06 PROCEDURE — 85610 PROTHROMBIN TIME: CPT

## 2020-07-06 PROCEDURE — 99211 OFF/OP EST MAY X REQ PHY/QHP: CPT

## 2020-08-05 ENCOUNTER — HOSPITAL ENCOUNTER (OUTPATIENT)
Dept: CT IMAGING | Age: 70
Discharge: HOME | End: 2020-08-07
Payer: MEDICARE

## 2020-08-05 PROCEDURE — 71250 CT THORAX DX C-: CPT

## 2020-08-17 ENCOUNTER — HOSPITAL ENCOUNTER (OUTPATIENT)
Dept: PHARMACY | Age: 70
Setting detail: THERAPIES SERIES
Discharge: HOME | End: 2020-08-17
Payer: MEDICARE

## 2020-08-17 VITALS — TEMPERATURE: 97.7 F

## 2020-08-17 LAB
INR BLD: 2.8
PROTIME: 33.2 SECONDS

## 2020-08-17 PROCEDURE — 99211 OFF/OP EST MAY X REQ PHY/QHP: CPT

## 2020-08-17 PROCEDURE — 85610 PROTHROMBIN TIME: CPT

## 2020-08-17 NOTE — PROGRESS NOTES
Patient states compliant all of the time with regimen. No bleeding or thromboembolic side effects noted. No significant med or dietary changes. No significant recent illness or disease state changes. PT/INR done in office per protocol. INR is 2.8 which is therapeutic. Warfarin regimen will be continued at current dose of 5mg Monday and 10mg all other days. Will retest in 6 weeks. Patient understands dosing directions and information discussed. Dosing schedule and follow up appointment given to patient. Progress note routed to referring physicians office. Discussed with patient the Pharmacist Collaborative Practice Agreement. Patient provided verbal and/or electronic (ex. [employer]) consent to participate in the collaborative practice agreement between the pharmacist and referred patient. This is in lieu of paper consent due to COVID-19 precautions and the use of remote/virtual visits.        CLINICAL PHARMACY CONSULT: MED RECONCILIATION/REVIEW ADDENDUM    For Pharmacy Admin Tracking Only    PHSO: No  Total # of Interventions Recommended: 0  - Maintenance Safety Lab Monitoring #: 1  Total Interventions Accepted: 0  Time Spent (min): [address] PharmD

## 2020-08-18 ENCOUNTER — VIRTUAL VISIT (OUTPATIENT)
Dept: PULMONOLOGY | Age: 70
End: 2020-08-18
Payer: MEDICARE

## 2020-08-18 PROCEDURE — 1123F ACP DISCUSS/DSCN MKR DOCD: CPT | Performed by: INTERNAL MEDICINE

## 2020-08-18 PROCEDURE — 3017F COLORECTAL CA SCREEN DOC REV: CPT | Performed by: INTERNAL MEDICINE

## 2020-08-18 PROCEDURE — G8417 CALC BMI ABV UP PARAM F/U: HCPCS | Performed by: INTERNAL MEDICINE

## 2020-08-18 PROCEDURE — 1036F TOBACCO NON-USER: CPT | Performed by: INTERNAL MEDICINE

## 2020-08-18 PROCEDURE — 4040F PNEUMOC VAC/ADMIN/RCVD: CPT | Performed by: INTERNAL MEDICINE

## 2020-08-18 PROCEDURE — 99214 OFFICE O/P EST MOD 30 MIN: CPT | Performed by: INTERNAL MEDICINE

## 2020-08-18 PROCEDURE — G8427 DOCREV CUR MEDS BY ELIG CLIN: HCPCS | Performed by: INTERNAL MEDICINE

## 2020-08-18 ASSESSMENT — SLEEP AND FATIGUE QUESTIONNAIRES
HOW LIKELY ARE YOU TO NOD OFF OR FALL ASLEEP WHILE WATCHING TV: 0
HOW LIKELY ARE YOU TO NOD OFF OR FALL ASLEEP WHILE SITTING QUIETLY AFTER LUNCH WITHOUT ALCOHOL: 0
HOW LIKELY ARE YOU TO NOD OFF OR FALL ASLEEP WHILE SITTING AND READING: 0
HOW LIKELY ARE YOU TO NOD OFF OR FALL ASLEEP IN A CAR, WHILE STOPPED FOR A FEW MINUTES IN TRAFFIC: 0
HOW LIKELY ARE YOU TO NOD OFF OR FALL ASLEEP WHEN YOU ARE A PASSENGER IN A CAR FOR AN HOUR WITHOUT A BREAK: 0
HOW LIKELY ARE YOU TO NOD OFF OR FALL ASLEEP WHILE LYING DOWN TO REST IN THE AFTERNOON WHEN CIRCUMSTANCES PERMIT: 0
ESS TOTAL SCORE: 0
HOW LIKELY ARE YOU TO NOD OFF OR FALL ASLEEP WHILE SITTING AND TALKING TO SOMEONE: 0
HOW LIKELY ARE YOU TO NOD OFF OR FALL ASLEEP WHILE SITTING INACTIVE IN A PUBLIC PLACE: 0

## 2020-08-18 ASSESSMENT — ENCOUNTER SYMPTOMS
EYES NEGATIVE: 1
RESPIRATORY NEGATIVE: 1

## 2020-08-18 NOTE — PROGRESS NOTES
2020    TELEHEALTH EVALUATION -- Audio/Visual (During MEWYE-73 public health emergency)    Patient and physician are located in their individual locations. This is visit is completed via [employer] application []/ Doxy. me[x] / Telephone []     HPI:    Kim Leigh (:  1950) has requested an audio/video evaluation for the following concern(s):    Follow-up visit for pulmonary nodule and sleep apnea. CT scan of the chest done on 2020 reviewed. The previously noted left lower lobe pulmonary nodule 1.1 cm on the 2019 exam is now stable at 4mm when compared to the scan done on 2020. Quit smoking 45 years ago. Low risk factor profile. No further follow-up indicated. No new abnormalities. Compliance download from from 2020-2020 shows 100% compliance for an average of 7 hours and 14 minutes. CPAP pressure 13. Residual AHI 3.4. Patient reports refreshing and restorative sleep. Denies excessive daytime sleepiness. Believes that he is benefiting greatly. States that his machine is old and he wishes a replacement unit. States that his DME  Says that he is eligible. Recommend auto titrating unit. Review of Systems   Constitutional: Negative. HENT: Negative. Eyes: Negative. Respiratory: Negative. Cardiovascular: Negative. All other systems reviewed and are negative. Prior to Visit Medications    Medication Sig Taking? Authorizing Provider   calcium acetate (PHOSLO) 667 MG capsule Take 667 mg by mouth 2 times daily (with meals) Yes Historical Provider, MD   amLODIPine (NORVASC) 2.5 MG tablet Take 1 tablet by mouth daily Yes Margie Marinelli MD   warfarin (COUMADIN) 10 MG tablet Take 1 tablet by mouth daily as directed by [facility].  Yes Inga Miranda MD   vitamin B-12 (CYANOCOBALAMIN) 1000 MCG tablet Take 1,000 mcg by mouth daily Yes Historical Provider, MD   amitriptyline (ELAVIL) 75 MG tablet Take 75 mg by mouth nightly  Yes Historical Provider, MD       Social History     Tobacco Use    Smoking status: Never Smoker    Smokeless tobacco: Never Used    Tobacco comment: smoked cigars in [place] [id number]'s   Substance Use Topics    Alcohol use: No    Drug use: No            RECORD REVIEW: Previous medical records were reviewed at today's visit. Wt Readings from Last 3 Encounters:   01/14/20 (!) 430 lb (195 kg)   10/07/19 (!) 413 lb (187.3 kg)   09/12/19 (!) 400 lb 6.4 oz (181.6 kg)       Results for orders placed or performed during the hospital encounter of 08/17/20   Protime-INR   Result Value Ref Range    INR 2.8    Protime-INR   Result Value Ref Range    Protime 33.2 seconds       Ct Chest Low Dose (ldct)    Result Date: 8/6/2020  EXAMINATION: LOW DOSE OF THE CT  CHEST WITHOUT CONTRAST 8/5/2020 5:56 pm TECHNIQUE: Low Dose of the CT Chest without the administration of intravenous contrast (MA=40). Multiplanar reformatted images are provided for review. Dose modulation, iterative reconstruction, and/or weight based adjustment of the mA/kV was utilized to reduce the radiation dose to as low as reasonably achievable. COMPARISON: 09/09/2017, 01/07/2020 HISTORY: ORDERING SYSTEM PROVIDED HISTORY: Abnormal x-ray of lungs with single pulmonary nodule TECHNOLOGIST PROVIDED HISTORY: LLL nodule: interval change Reason for Exam: LLL nodule: interval change -Abnormal x-ray of lungs with single pulmonary nodule Acuity: Acute Type of Exam: Initial Relevant Medical/Surgical History: Hx of HTN, morbid obesity 65-year-old male with left lower lobe pulmonary nodule; assess for interval change FINDINGS: Exam limited due to streak artifact from patient's large body habitus. Mediastinum: Cardiomegaly. Coronary artery disease. No axillary, mediastinal, or hilar lymphadenopathy. Thyroid gland is difficult to assess due to artifact from patient's body habitus. Atherosclerotic calcification of the aorta and branch vasculature.  Lungs/pleura: Trachea and proximal central airways appear patent. Mild dependent atelectasis. Respiratory motion. No lobar airspace consolidation. No pneumothorax. Mild emphysema. Parenchymal banding in the right middle lobe and lingula. Stable residual 4 mm pulmonary nodule which previously measured 1.1 cm on the 09/09/2019 exam, but is stable in size from 01/07/2020. Upper Abdomen: Fatty liver. Evaluation of the upper abdomen is severely limited due to patient's body habitus, noise and streak artifact. Prior cholecystectomy. Soft Tissues/Bones: Moderate diffuse degenerative changes throughout the spine. 1. Stable residual 4 mm pulmonary nodule, stable from 01/07/2020, decreased in size from 09/09/2019 where it previously measured 1.1 cm. 2. Cardiomegaly. Coronary artery disease. 3. Atherosclerotic calcification of the aorta and branch vasculature. 4. Mild emphysema. Respiratory motion. Mild dependent atelectasis. Parenchymal banding in the right middle lobe and lingula. 5. Fatty liver. Prior cholecystectomy. RECOMMENDATIONS: Category 2, Benign Appearance or Behavior. Management:  Continue annual lung screening with LDCT in 12 months. PHYSICAL EXAMINATION:  Due to this being a TeleHealth encounter, evaluation of the following organ systems is limited: Vitals/Constitutional/EENT/Resp/CV/GI//MS/Neuro/Skin/Heme-Lymph-Imm. Constitutional: [x] Appears well-developed and well-nourished. [x] Abnormal obese  Mental status  [x] Alert and awake  [x] Oriented to person/place/time [x]Able to follow commands    [x] No apparent distress      Eyes:  EOM    [x]  Normal  [] Abnormal-  Sclera  [x]  Normal  [] Abnormal -         Discharge [x]  None visible  [] Abnormal -    HENT:   [x] Normocephalic, atraumatic. [] Abnormal shaped head   [x] Mouth/Throat: Mucous membranes are moist.     Ears [x] Normal  [] Abnormal-    Neck: [x] Normal range of motion [x] Supple [x] No visualized mass.      Pulmonary/Chest: [x] Respiratory effort normal.  [x] No visualized signs of difficulty breathing or respiratory distress        [] Abnormal      Musculoskeletal:   [x] Normal range of motion. [x] Normal gait with no signs of ataxia. [x]  No signs of cyanosis of the peripheral portions of extremities. [] Abnormal       Neurological:        [x] Normal cranial nerve (limited exam to video visit) [x] No focal weakness observed       [] Abnormal          Speech       [x] Normal   [] Abnormal     Skin:        [x] No rash on visible skin  [x] Normal  [] Abnormal     Psychiatric:       [x] Normal  [] Abnormal        [x] Normal Mood  [] Anxious appearing        Other Pertinent Exam Findings:       ASSESSMENT:  1. STEPH on CPAP    2. Morbid obesity due to excess calories ([address]    3. Abnormal x-ray of lungs with single pulmonary nodule          Plan:   1. Auto titrating CPAP 6-16 cm water pressure with heated humidifier, fullface mask, tubing and supplies. 2. Weight loss. 3. No further imaging required relative to left lower lobe 4 mm pulmonary nodule. 4. Discussed strategies to mitigate risk of COVID-19 infection. 5. Flu shot in fall. 6. Return in 1 year. An  electronic signature was used to authenticate this note. --Paola Field DO on 8/18/2020 at 10:01 AM    9}    Pursuant to the emergency declaration under the [facility], [facility] waiver authority and the [employer] Act, this Virtual  Visit was conducted, with patient's consent, to reduce the patient's risk of exposure to COVID-19 and provide continuity of care for an established patient. Services were provided through a video synchronous discussion virtually to substitute for in-person clinic visit.

## 2020-08-31 ENCOUNTER — TELEPHONE (OUTPATIENT)
Dept: PULMONOLOGY | Age: 70
End: 2020-08-31

## 2020-09-21 RX ORDER — WARFARIN SODIUM 10 MG/1
5-10 TABLET ORAL DAILY
Qty: 90 TABLET | Refills: 3 | OUTPATIENT
Start: 2020-09-21 | End: 2021-11-18 | Stop reason: SDUPTHER

## 2020-09-21 NOTE — TELEPHONE ENCOUNTER
Refill for warfarin 10mg tabs called in to [address] per pt request      CLINICAL PHARMACY CONSULT: MED RECONCILIATION/REVIEW ADDENDUM    For Pharmacy Admin Tracking Only    PHSO: No  Total # of Interventions Recommended: 1  - Refills Provided #: 1  Total Interventions Accepted: 1  Time Spent (min): [address], PharmD

## 2020-09-28 ENCOUNTER — HOSPITAL ENCOUNTER (OUTPATIENT)
Dept: PHARMACY | Age: 70
Setting detail: THERAPIES SERIES
Discharge: HOME | End: 2020-09-28
Payer: MEDICARE

## 2020-09-28 VITALS — TEMPERATURE: 97.7 F

## 2020-09-28 LAB
INR BLD: 3.7
PROTIME: 44.9 SECONDS

## 2020-09-28 PROCEDURE — 99212 OFFICE O/P EST SF 10 MIN: CPT

## 2020-09-28 PROCEDURE — 85610 PROTHROMBIN TIME: CPT

## 2020-09-28 NOTE — PROGRESS NOTES
Patient states compliant all of the time with regimen. No bleeding or thromboembolic side effects noted. No significant med or dietary changes. No significant recent illness or disease state changes. PT/INR done in office per protocol. INR is 3.7 which is supratherapeutic with no identified reason. Warfarin regimen will be held for today, then continue usual regimen of 5mg Monday and 10mg all other days. Will retest in 2 weeks to assess. Patient understands dosing directions and information discussed. Dosing schedule and follow up appointment given to patient. Progress note routed to referring physicians office. Discussed with patient the Pharmacist Collaborative Practice Agreement. Patient provided verbal and/or electronic (ex. [employer]) consent to participate in the collaborative practice agreement between the pharmacist and referred patient. This is in lieu of paper consent due to COVID-19 precautions and the use of remote/virtual visits.        CLINICAL PHARMACY CONSULT: MED RECONCILIATION/REVIEW ADDENDUM    For Pharmacy Admin Tracking Only    PHSO: No  Total # of Interventions Recommended: 1  - Decreased Dose #: 1  - Maintenance Safety Lab Monitoring #: 1  Total Interventions Accepted: 1  Time Spent (min): [address] PharmD

## 2020-10-12 ENCOUNTER — HOSPITAL ENCOUNTER (OUTPATIENT)
Dept: PHARMACY | Age: 70
Setting detail: THERAPIES SERIES
Discharge: HOME | End: 2020-10-12
Payer: MEDICARE

## 2020-10-12 VITALS — TEMPERATURE: 97.8 F

## 2020-10-12 LAB
INR BLD: 3.3
PROTIME: 39.3 SECONDS

## 2020-10-12 PROCEDURE — 99212 OFFICE O/P EST SF 10 MIN: CPT

## 2020-10-12 PROCEDURE — 85610 PROTHROMBIN TIME: CPT

## 2020-10-12 NOTE — PROGRESS NOTES
Patient states compliant all of the time with regimen. No bleeding or thromboembolic side effects noted. No significant med or dietary changes. Patient complains of terrible knee pain which has been persistent over the last several weeks. He is planning to get injections but unsure of the date. PT/INR done in office per protocol. INR is 3.3 which is supratherapeutic likely from increased pain/inflammation. Warfarin regimen will be decreased to 5mg Mon/Thurs and 10mg all other days. Will retest in 2 weeks. Patient understands dosing directions and information discussed. Dosing schedule and follow up appointment given to patient. Progress note routed to referring physicians office. Discussed with patient the Pharmacist Collaborative Practice Agreement. Patient provided verbal and/or electronic (ex. [employer]) consent to participate in the collaborative practice agreement between the pharmacist and referred patient. This is in lieu of paper consent due to COVID-19 precautions and the use of remote/virtual visits.        CLINICAL PHARMACY CONSULT: MED RECONCILIATION/REVIEW ADDENDUM    For Pharmacy Admin Tracking Only    PHSO: No  Total # of Interventions Recommended: 1  - Decreased Dose #: 1  - Maintenance Safety Lab Monitoring #: 1  Total Interventions Accepted: 1  Time Spent (min): [address] PharmD

## 2020-10-26 ENCOUNTER — HOSPITAL ENCOUNTER (OUTPATIENT)
Dept: PHARMACY | Age: 70
Setting detail: THERAPIES SERIES
Discharge: HOME | End: 2020-10-26
Payer: MEDICARE

## 2020-10-26 VITALS — TEMPERATURE: 97.3 F

## 2020-10-26 LAB
INR BLD: 3.5
PROTIME: 41.5 SECONDS

## 2020-10-26 PROCEDURE — 85610 PROTHROMBIN TIME: CPT

## 2020-10-26 PROCEDURE — 99212 OFFICE O/P EST SF 10 MIN: CPT

## 2020-10-26 NOTE — PROGRESS NOTES
Patient seen in clinic for warfarin management due to DVT with an INR goal of 2.0-3.0. Estimated duration of therapy is indefinite. Patient states compliant all of the time with regimen. No bleeding or thromboembolic side effects noted. No significant med or dietary changes. Pt states he still has constant knee pain. PT/INR done in office per protocol. INR is 3.5 which is supratherapeutic likely to the knee pain. Warfarin regimen will be held for today then decreased to 5 mg Mon, Thu, Sat and 10 mg all other days. Will retest in 2 weeks. Patient understands dosing directions and information discussed. Dosing schedule and follow up appointment given to patient. Progress note routed to referring physicians office. Discussed with patient the Pharmacist Collaborative Practice Agreement. Patient provided verbal and/or electronic (ex. [employer]) consent to participate in the collaborative practice agreement between the pharmacist and referred patient. This is in lieu of paper consent due to COVID-19 precautions and the use of remote/virtual visits.      CLINICAL PHARMACY CONSULT: MED RECONCILIATION/REVIEW ADDENDUM    For Pharmacy Admin Tracking Only    PHSO: No  Total # of Interventions Recommended: 1  - Decreased Dose #: 1  - Maintenance Safety Lab Monitoring #: 1  Total Interventions Accepted: 1  Time Spent (min): [address] PharmD

## 2020-11-09 ENCOUNTER — HOSPITAL ENCOUNTER (OUTPATIENT)
Dept: PHARMACY | Age: 70
Setting detail: THERAPIES SERIES
Discharge: HOME | End: 2020-11-09
Payer: MEDICARE

## 2020-11-09 VITALS — TEMPERATURE: 97.1 F

## 2020-11-09 LAB
INR BLD: 2.3
PROTIME: 27.4 SECONDS

## 2020-11-09 PROCEDURE — 99211 OFF/OP EST MAY X REQ PHY/QHP: CPT

## 2020-11-09 PROCEDURE — 85610 PROTHROMBIN TIME: CPT

## 2020-11-09 NOTE — PROGRESS NOTES
Patient seen in clinic for warfarin management due to DVT with an INR goal of 2.0-3.0. Estimated duration of therapy is indefinite. Patient states compliant all of the time with regimen. No bleeding or thromboembolic side effects noted. No significant med or dietary changes. No significant recent illness or disease state changes. PT/INR done in office per protocol. INR is 2.3 which is therapeutic. Warfarin regimen will be continued at current dose 5 mg Monday, Thursday, Saturday, 10 mg all other days. Will retest in 3 weeks. Patient understands dosing directions and information discussed. Dosing schedule and follow up appointment given to patient. Progress note routed to referring physicians office. Discussed with patient the Pharmacist Collaborative Practice Agreement. Patient provided verbal and/or electronic (ex. [employer]) consent to participate in the collaborative practice agreement between the pharmacist and referred patient. This is in lieu of paper consent due to COVID-19 precautions and the use of remote/virtual visits.        CLINICAL PHARMACY CONSULT: MED RECONCILIATION/REVIEW ADDENDUM    For Pharmacy Admin Tracking Only    PHSO: No  Total # of Interventions Recommended: 0  - Maintenance Safety Lab Monitoring #: 1  Total Interventions Accepted: 0  Time Spent (min): [address], PharmD Candidate 0083

## 2020-11-30 ENCOUNTER — HOSPITAL ENCOUNTER (OUTPATIENT)
Dept: PHARMACY | Age: 70
Setting detail: THERAPIES SERIES
Discharge: HOME | End: 2020-11-30
Payer: MEDICARE

## 2020-11-30 VITALS — TEMPERATURE: 97.3 F

## 2020-11-30 LAB
INR BLD: 1.7
PROTIME: 20.9 SECONDS

## 2020-11-30 PROCEDURE — 85610 PROTHROMBIN TIME: CPT

## 2020-11-30 PROCEDURE — 99212 OFFICE O/P EST SF 10 MIN: CPT

## 2020-11-30 NOTE — PROGRESS NOTES
Patient seen in clinic for warfarin management due to DVT with an INR goal of 2.0-3.0. Estimated duration of therapy is indefinite. Patient states compliant all of the time with regimen. No bleeding or thromboembolic side effects noted. No significant med changes. No significant recent illness or disease state changes. Patient had broccoli last night. PT/INR done in office per protocol. INR is 1.7 which is subtherapeutic. Warfarin regimen will be increased to 10 mg today, then resume regimen of 5 mg Monday, Thursday, Saturday, 10 mg all other days. Will retest in 3 weeks. Patient understands dosing directions and information discussed. Dosing schedule and follow up appointment given to patient. Progress note routed to referring physicians office. Discussed with patient the Pharmacist Collaborative Practice Agreement. Patient provided verbal and/or electronic (ex. [employer]) consent to participate in the collaborative practice agreement between the pharmacist and referred patient. This is in lieu of paper consent due to COVID-19 precautions and the use of remote/virtual visits.        CLINICAL PHARMACY CONSULT: MED RECONCILIATION/REVIEW ADDENDUM    For Pharmacy Admin Tracking Only    PHSO: No  Total # of Interventions Recommended: 1  - Increased Dose #: 1  - Maintenance Safety Lab Monitoring #: 1  Total Interventions Accepted: 1  Time Spent (min): [address], PharmD Candidate [id number]

## 2020-12-21 ENCOUNTER — HOSPITAL ENCOUNTER (OUTPATIENT)
Dept: PHARMACY | Age: 70
Setting detail: THERAPIES SERIES
Discharge: HOME | End: 2020-12-21
Payer: MEDICARE

## 2020-12-21 VITALS — HEART RATE: 97 BPM

## 2020-12-21 LAB
INR BLD: 2.7
PROTIME: 32 SECONDS

## 2020-12-21 PROCEDURE — 99211 OFF/OP EST MAY X REQ PHY/QHP: CPT

## 2020-12-21 PROCEDURE — 85610 PROTHROMBIN TIME: CPT

## 2020-12-21 RX ORDER — AMLODIPINE BESYLATE 5 MG/1
TABLET ORAL
COMMUNITY
Start: 2020-11-24 | End: 2021-03-01 | Stop reason: DRUGHIGH

## 2020-12-21 NOTE — PROGRESS NOTES
Patient seen in clinic for warfarin management due to DVT with an INR goal of 2.0-3.0. Estimated duration of therapy is indefinite. Patient states compliant all of the time with regimen. No bleeding or thromboembolic side effects noted. No significant med or dietary changes. No significant recent illness or disease state changes. PT/INR done in office per protocol. INR is 2.7 which is therapeutic. Warfarin regimen will be continued at current dose of 5mg Mon/Thur/Sat and 10mg all other days. Will retest in 5 weeks. Patient understands dosing directions and information discussed. Dosing schedule and follow up appointment given to patient. Progress note routed to referring physicians office. Discussed with patient the Pharmacist Collaborative Practice Agreement. Patient provided verbal and/or electronic (ex. [employer]) consent to participate in the collaborative practice agreement between the pharmacist and referred patient. This is in lieu of paper consent due to COVID-19 precautions and the use of remote/virtual visits.        CLINICAL PHARMACY CONSULT: MED RECONCILIATION/REVIEW ADDENDUM    For Pharmacy Admin Tracking Only    PHSO: No  Total # of Interventions Recommended: 0  - Maintenance Safety Lab Monitoring #: 1  Total Interventions Accepted: 0  Time Spent (min): [address] PharmD

## 2021-01-25 ENCOUNTER — HOSPITAL ENCOUNTER (OUTPATIENT)
Dept: PHARMACY | Age: 71
Setting detail: THERAPIES SERIES
Discharge: HOME | End: 2021-01-25
Payer: MEDICARE

## 2021-01-25 VITALS — TEMPERATURE: 96.9 F

## 2021-01-25 DIAGNOSIS — I82.499 DEEP VEIN THROMBOSIS (DVT) OF OTHER VEIN OF LOWER EXTREMITY, UNSPECIFIED CHRONICITY, UNSPECIFIED LATERALITY (HCC): ICD-10-CM

## 2021-01-25 LAB
INR BLD: 2.8
PROTIME: 33.5 SECONDS

## 2021-01-25 PROCEDURE — 99211 OFF/OP EST MAY X REQ PHY/QHP: CPT

## 2021-01-25 PROCEDURE — 85610 PROTHROMBIN TIME: CPT

## 2021-02-12 ENCOUNTER — IMMUNIZATION (OUTPATIENT)
Dept: FAMILY MEDICINE CLINIC | Age: 71
End: 2021-02-12
Payer: MEDICARE

## 2021-02-12 PROCEDURE — 0011A COVID-19, MODERNA VACCINE 100MCG/0.5ML DOSE: CPT | Performed by: INTERNAL MEDICINE

## 2021-02-12 PROCEDURE — 91301 COVID-19, MODERNA VACCINE 100MCG/0.5ML DOSE: CPT | Performed by: INTERNAL MEDICINE

## 2021-03-01 ENCOUNTER — HOSPITAL ENCOUNTER (OUTPATIENT)
Dept: PHARMACY | Age: 71
Setting detail: THERAPIES SERIES
Discharge: HOME | End: 2021-03-01
Payer: MEDICARE

## 2021-03-01 VITALS — TEMPERATURE: 96.9 F

## 2021-03-01 DIAGNOSIS — I82.499 DEEP VEIN THROMBOSIS (DVT) OF OTHER VEIN OF LOWER EXTREMITY, UNSPECIFIED CHRONICITY, UNSPECIFIED LATERALITY (HCC): ICD-10-CM

## 2021-03-01 LAB
INR BLD: 3
PROTIME: 35.9 SECONDS

## 2021-03-01 PROCEDURE — 85610 PROTHROMBIN TIME: CPT

## 2021-03-01 PROCEDURE — 99211 OFF/OP EST MAY X REQ PHY/QHP: CPT

## 2021-03-01 RX ORDER — SENNOSIDES 8.6 MG
650 CAPSULE ORAL EVERY 8 HOURS PRN
COMMUNITY

## 2021-03-01 RX ORDER — AMLODIPINE BESYLATE 10 MG/1
TABLET ORAL
COMMUNITY
Start: 2021-01-11

## 2021-03-01 NOTE — PROGRESS NOTES
Patient seen in clinic for warfarin management due to DVT with an INR goal of 2.0-3.0. Estimated duration of therapy is indefinite. Patient states compliant all of the time with regimen. No bleeding or thromboembolic side effects noted. No significant dietary changes. Patient is taking CBD gummies and topical cream.  He has been taking APAP 650mg 2 tablets BID when going out and about. No significant recent illness or disease state changes. PT/INR done in office per protocol. INR is 3.0 which is therapeutic. Warfarin regimen will be continued at current dose of 5mg Mon/Thur/Sat and 10mg all other days. Will retest in 5 weeks. Patient understands dosing directions and information discussed. Dosing schedule and follow up appointment given to patient. Progress note routed to referring physicians office. Discussed with patient the Pharmacist Collaborative Practice Agreement. Patient provided verbal and/or electronic (ex. Jobspothart) consent to participate in the collaborative practice agreement between the pharmacist and referred patient. This is in lieu of paper consent due to COVID-19 precautions and the use of remote/virtual visits.        CLINICAL PHARMACY CONSULT: MED RECONCILIATION/REVIEW ADDENDUM    For Pharmacy Admin Tracking Only    PHSO: No  Total # of Interventions Recommended: 0  - Maintenance Safety Lab Monitoring #: 1  Total Interventions Accepted: 0  Time Spent (min): [address] PharmD

## 2021-03-12 ENCOUNTER — IMMUNIZATION (OUTPATIENT)
Dept: FAMILY MEDICINE CLINIC | Age: 71
End: 2021-03-12
Payer: MEDICARE

## 2021-03-12 PROCEDURE — 91301 COVID-19, MODERNA VACCINE 100MCG/0.5ML DOSE: CPT | Performed by: INTERNAL MEDICINE

## 2021-03-12 PROCEDURE — 0012A COVID-19, MODERNA VACCINE 100MCG/0.5ML DOSE: CPT | Performed by: INTERNAL MEDICINE

## 2021-04-05 ENCOUNTER — HOSPITAL ENCOUNTER (OUTPATIENT)
Dept: PHARMACY | Age: 71
Setting detail: THERAPIES SERIES
Discharge: HOME | End: 2021-04-05
Payer: MEDICARE

## 2021-04-05 DIAGNOSIS — I82.499 DEEP VEIN THROMBOSIS (DVT) OF OTHER VEIN OF LOWER EXTREMITY, UNSPECIFIED CHRONICITY, UNSPECIFIED LATERALITY (HCC): ICD-10-CM

## 2021-04-05 LAB
INR BLD: 3.2
PROTIME: 38.2 SECONDS

## 2021-04-05 PROCEDURE — 99211 OFF/OP EST MAY X REQ PHY/QHP: CPT

## 2021-04-05 PROCEDURE — 85610 PROTHROMBIN TIME: CPT

## 2021-04-05 NOTE — PROGRESS NOTES
Patient seen in clinic for warfarin management due to DVT with an INR goal of 2.0-3.0. Estimated duration of therapy is indefinite. Patient states compliant all of the time with regimen. No bleeding or thromboembolic side effects noted. No significant med or dietary changes. No significant recent illness or disease state changes. PT/INR done in office per protocol. INR is 3.2 which is just above goal.     Warfarin regimen will be continued at current dose of 5mg Mon/Thur/Sat and 10mg all other days. Patient instructed to increase dietary vitamin K over the next 4 weeks  Will retest in 4 weeks. Patient understands dosing directions and information discussed. Dosing schedule and follow up appointment given to patient. Progress note routed to referring physicians office. Discussed with patient the Pharmacist Collaborative Practice Agreement. Patient provided verbal and/or electronic (ex. Interle[employer] Geneticshart) consent to participate in the collaborative practice agreement between the pharmacist and referred patient. This is in lieu of paper consent due to COVID-19 precautions and the use of remote/virtual visits.        CLINICAL PHARMACY CONSULT: MED RECONCILIATION/REVIEW ADDENDUM    For Pharmacy Admin Tracking Only    PHSO: No  Total # of Interventions Recommended: 0  - Maintenance Safety Lab Monitoring #: 1  Total Interventions Accepted: 0  Time Spent (min): [address] PharmD

## 2021-05-03 ENCOUNTER — HOSPITAL ENCOUNTER (OUTPATIENT)
Dept: PHARMACY | Age: 71
Setting detail: THERAPIES SERIES
Discharge: HOME | End: 2021-05-03
Payer: MEDICARE

## 2021-05-03 DIAGNOSIS — I82.499 DEEP VEIN THROMBOSIS (DVT) OF OTHER VEIN OF LOWER EXTREMITY, UNSPECIFIED CHRONICITY, UNSPECIFIED LATERALITY (HCC): ICD-10-CM

## 2021-05-03 LAB
INR BLD: 2.6
PROTIME: 31.5 SECONDS

## 2021-05-03 PROCEDURE — 85610 PROTHROMBIN TIME: CPT

## 2021-05-03 PROCEDURE — 99211 OFF/OP EST MAY X REQ PHY/QHP: CPT

## 2021-05-03 NOTE — PROGRESS NOTES
Patient seen in clinic for warfarin management due to DVT with an INR goal of 2.0-3.0. Estimated duration of therapy is indefinite. Patient states he missed his dose last night. No bleeding or thromboembolic side effects noted. No significant med or dietary changes. No significant recent illness or disease state changes. PT/INR done in office per protocol. INR is 2.6 which is therapeutic. Warfarin regimen will be continued at current dose of 5mg Mon/Thur/Sat and 10mg all other days. Will retest in 5 weeks. Patient understands dosing directions and information discussed. Dosing schedule and follow up appointment given to patient. Progress note routed to referring physicians office. Discussed with patient the Pharmacist Collaborative Practice Agreement. Patient provided verbal and/or electronic (ex. [employer]) consent to participate in the collaborative practice agreement between the pharmacist and referred patient. This is in lieu of paper consent due to COVID-19 precautions and the use of remote/virtual visits.        For Pharmacy Admin Tracking Only     Intervention Detail: Adherence Monitorin   Total # of Interventions Recommended: 1   Total # of Interventions Accepted: 1   Time Spent (min): 15

## 2021-06-07 ENCOUNTER — HOSPITAL ENCOUNTER (OUTPATIENT)
Dept: PHARMACY | Age: 71
Setting detail: THERAPIES SERIES
Discharge: HOME | End: 2021-06-07
Payer: MEDICARE

## 2021-06-07 DIAGNOSIS — I82.409 DEEP VEIN THROMBOSIS (DVT) OF LOWER EXTREMITY, UNSPECIFIED CHRONICITY, UNSPECIFIED LATERALITY, UNSPECIFIED VEIN (HCC): Primary | ICD-10-CM

## 2021-06-07 LAB
INR BLD: 3.8
PROTIME: 45.5 SECONDS

## 2021-06-07 PROCEDURE — 85610 PROTHROMBIN TIME: CPT

## 2021-06-07 PROCEDURE — 99212 OFFICE O/P EST SF 10 MIN: CPT

## 2021-06-07 NOTE — PROGRESS NOTES
Patient seen in clinic for warfarin management due to DVT with an INR goal of 2.0-3.0. Estimated duration of therapy is indefinite. Patient states compliant all of the time with regimen. No bleeding or thromboembolic side effects noted. Patient states he only had a little lettuce recently - possibly less greens than usual. He also has been taking variable amount of CBD gummies. He says he was taking 25mg but then got some 100mg at a gas station. Advised patient to try to be consistent with supplements as if there is impact of warfarin we can manage better when the supplement is taken regularly. No significant recent illness or disease state changes. PT/INR done in office per protocol. INR is 3.8 which is supratherapeutic. Warfarin regimen will be held for today, 5mg x 1 then resume 5mg Mon/Thur/Sat and 10mg all other days. Will retest in 2 weeks to assess. Patient understands dosing directions and information discussed. Dosing schedule and follow up appointment given to patient. Progress note routed to referring physicians office. Discussed with patient the Pharmacist Collaborative Practice Agreement. Patient provided verbal and/or electronic (ex. Isonashart) consent to participate in the collaborative practice agreement between the pharmacist and referred patient. This is in lieu of paper consent due to COVID-19 precautions and the use of remote/virtual visits.        For Pharmacy Admin Tracking Only     Intervention Detail: Dose Adjustment: 1: reason: Therapy De-escalation   Total # of Interventions Recommended: 1   Total # of Interventions Accepted: 1   Time Spent (min): 15

## 2021-06-21 ENCOUNTER — HOSPITAL ENCOUNTER (OUTPATIENT)
Dept: PHARMACY | Age: 71
Setting detail: THERAPIES SERIES
Discharge: HOME | End: 2021-06-21
Payer: MEDICARE

## 2021-06-21 DIAGNOSIS — I82.499 DEEP VEIN THROMBOSIS (DVT) OF OTHER VEIN OF LOWER EXTREMITY, UNSPECIFIED CHRONICITY, UNSPECIFIED LATERALITY (HCC): Primary | ICD-10-CM

## 2021-06-21 LAB
INR BLD: 2.9
PROTIME: 35 SECONDS

## 2021-06-21 PROCEDURE — 85610 PROTHROMBIN TIME: CPT

## 2021-06-21 PROCEDURE — 99211 OFF/OP EST MAY X REQ PHY/QHP: CPT

## 2021-06-21 NOTE — PROGRESS NOTES
Patient seen in clinic for warfarin management due to DVT with an INR goal of 2.0-3.0. Estimated duration of therapy is indefinite. Patient states he skipped his dose last night because he thought his INR was going to be high. No bleeding or thromboembolic side effects noted. No significant med or dietary changes. No significant recent illness or disease state changes. PT/INR done in office per protocol. INR is 2.9 which is therapeutic. Warfarin regimen will be decreased to target 5mg Mon/Wed/Fri/Sat and 10mg all other days. Will retest in 4 weeks. Patient understands dosing directions and information discussed. Dosing schedule and follow up appointment given to patient. Progress note routed to referring physicians office. Discussed with patient the Pharmacist Collaborative Practice Agreement. Patient provided verbal and/or electronic (ex. KnotProfithart) consent to participate in the collaborative practice agreement between the pharmacist and referred patient. This is in lieu of paper consent due to COVID-19 precautions and the use of remote/virtual visits.        For Pharmacy Admin Tracking Only     Intervention Detail: Adherence Monitorin and Dose Adjustment: 1, reason: Therapy De-escalation   Total # of Interventions Recommended: 2   Total # of Interventions Accepted: 2   Time Spent (min): 15

## 2021-07-19 ENCOUNTER — HOSPITAL ENCOUNTER (OUTPATIENT)
Dept: PHARMACY | Age: 71
Setting detail: THERAPIES SERIES
Discharge: HOME | End: 2021-07-19
Payer: MEDICARE

## 2021-07-19 DIAGNOSIS — I82.499 DEEP VEIN THROMBOSIS (DVT) OF OTHER VEIN OF LOWER EXTREMITY, UNSPECIFIED CHRONICITY, UNSPECIFIED LATERALITY (HCC): Primary | ICD-10-CM

## 2021-07-19 LAB
INR BLD: 2.5
PROTIME: 30.6 SECONDS

## 2021-07-19 PROCEDURE — 85610 PROTHROMBIN TIME: CPT

## 2021-07-19 PROCEDURE — 99211 OFF/OP EST MAY X REQ PHY/QHP: CPT

## 2021-07-19 RX ORDER — NYSTATIN 100000 [USP'U]/G
POWDER TOPICAL
COMMUNITY
Start: 2021-07-12

## 2021-07-19 NOTE — PROGRESS NOTES
Patient seen in clinic for warfarin management due to DVT with an INR goal of 2.0-3.0. Estimated duration of therapy is indefinite. Patient states he was taking 5mg only 3 days instead of 4. No bleeding or thromboembolic side effects noted. No significant med changes. Patient states he started Herbal Life Shakes almost 2 weeks ago. He is having a higher protein breakfast, shake for lunch, and more sensible dinner. He eliminated pop and icecream and is making a strong effort to lose weight. No significant recent illness or disease state changes. PT/INR done in office per protocol. INR is 2.5 which is therapeutic. Warfarin regimen will be continued with dose patient has been taking - 5mg Mon/Wed/Fri and 10mg all other days. Will retest in 4 weeks. Patient understands dosing directions and information discussed. Dosing schedule and follow up appointment given to patient. Progress note routed to referring physicians office. Discussed with patient the Pharmacist Collaborative Practice Agreement. Patient provided verbal and/or electronic (ex. Smart[employer] Analyticshart) consent to participate in the collaborative practice agreement between the pharmacist and referred patient. This is in lieu of paper consent due to COVID-19 precautions and the use of remote/virtual visits.        For Pharmacy Admin Tracking Only     Intervention Detail: Adherence Monitorin   Total # of Interventions Recommended: 1   Total # of Interventions Accepted: 1   Time Spent (min): 15

## 2021-08-16 ENCOUNTER — HOSPITAL ENCOUNTER (OUTPATIENT)
Dept: PHARMACY | Age: 71
Setting detail: THERAPIES SERIES
Discharge: HOME | End: 2021-08-16
Payer: MEDICARE

## 2021-08-16 DIAGNOSIS — I82.90 DEEP VEIN THROMBOSIS (DVT) OF NON-EXTREMITY VEIN, UNSPECIFIED CHRONICITY: Primary | ICD-10-CM

## 2021-08-16 LAB
INR BLD: 2.7
PROTIME: 32.4 SECONDS

## 2021-08-16 PROCEDURE — 99211 OFF/OP EST MAY X REQ PHY/QHP: CPT

## 2021-08-16 PROCEDURE — 85610 PROTHROMBIN TIME: CPT

## 2021-08-16 NOTE — PROGRESS NOTES
Patient seen in clinic for warfarin management due to DVT with an INR goal of 2.0-3.0. Estimated duration of therapy is indefinite. Patient states compliant all of the time with regimen. No bleeding or thromboembolic side effects noted. No significant med or dietary changes. No significant recent illness or disease state changes. Patient reports his diet is the same as it was at last visit - eating more vegetables/protein as part of low carb diet to lose weight. PT/INR done in office per protocol. INR is 2.7 which is therapeutic. Warfarin regimen will be continued at current dose of 5mg MWF and 10mg all other days. Will retest in 4 weeks. Patient understands dosing directions and information discussed. Dosing schedule and follow up appointment given to patient. Progress note routed to referring physicians office. Discussed with patient the Pharmacist Collaborative Practice Agreement. Patient provided verbal and/or electronic (ex. Cliphart) consent to participate in the collaborative practice agreement between the pharmacist and referred patient. This is in lieu of paper consent due to COVID-19 precautions and the use of remote/virtual visits.        For Pharmacy Admin Tracking Only     Intervention Detail:    Total # of Interventions Recommended: 0   Total # of Interventions Accepted: 0   Time Spent (min): 15

## 2021-08-24 ENCOUNTER — VIRTUAL VISIT (OUTPATIENT)
Dept: PULMONOLOGY | Age: 71
End: 2021-08-24
Payer: MEDICARE

## 2021-08-24 DIAGNOSIS — R91.1 ABNORMAL X-RAY OF LUNGS WITH SINGLE PULMONARY NODULE: ICD-10-CM

## 2021-08-24 DIAGNOSIS — Z99.89 OSA ON CPAP: Primary | ICD-10-CM

## 2021-08-24 DIAGNOSIS — E66.01 CLASS 3 SEVERE OBESITY DUE TO EXCESS CALORIES WITH SERIOUS COMORBIDITY AND BODY MASS INDEX (BMI) OF 60.0 TO 69.9 IN ADULT (HCC): ICD-10-CM

## 2021-08-24 DIAGNOSIS — G47.33 OSA ON CPAP: Primary | ICD-10-CM

## 2021-08-24 PROCEDURE — G8427 DOCREV CUR MEDS BY ELIG CLIN: HCPCS | Performed by: INTERNAL MEDICINE

## 2021-08-24 PROCEDURE — 1123F ACP DISCUSS/DSCN MKR DOCD: CPT | Performed by: INTERNAL MEDICINE

## 2021-08-24 PROCEDURE — 99213 OFFICE O/P EST LOW 20 MIN: CPT | Performed by: INTERNAL MEDICINE

## 2021-08-24 PROCEDURE — 4040F PNEUMOC VAC/ADMIN/RCVD: CPT | Performed by: INTERNAL MEDICINE

## 2021-08-24 PROCEDURE — G8421 BMI NOT CALCULATED: HCPCS | Performed by: INTERNAL MEDICINE

## 2021-08-24 PROCEDURE — 3017F COLORECTAL CA SCREEN DOC REV: CPT | Performed by: INTERNAL MEDICINE

## 2021-08-24 PROCEDURE — 1036F TOBACCO NON-USER: CPT | Performed by: INTERNAL MEDICINE

## 2021-08-24 ASSESSMENT — ENCOUNTER SYMPTOMS
EYES NEGATIVE: 1
SHORTNESS OF BREATH: 1

## 2021-08-24 NOTE — PROGRESS NOTES
2021    TELEHEALTH EVALUATION -- Audio/Visual (During ZYTPC-73 public health emergency)    Patient and physician are located in their individual locations. This is visit is completed via [employer] application []/ Doxy. me[x] / Telephone []     HPI:    Robles Dexter (:  1950) has requested an audio/video evaluation for the following concern(s):    Patient returns for follow up of sleep apnea. Since last visit 12 months ago, patient has been very compliant with CPAP. Uses every night, for the entire night. Reports refreshing and restorative sleep. Denies snoring. Reports normal daytime alertness. Believes benefiting greatly. Compliance download from 21 to 21 shows 100% compliance for average of 7hrs per night. Residual AHI 5.7. Mean [id number] cm H2O. Patient states that he is using CBD oil to help him sleep. Reports a dramatic improvement in the quality and duration of his sleep. Using herbal life for weight loss. Unclear whether this has been helpful. Received both of his Covid vaccinations. Review of Systems   Constitutional: Negative. HENT: Negative. Eyes: Negative. Respiratory: Positive for shortness of breath. Cardiovascular: Negative. Musculoskeletal: Positive for gait problem. All other systems reviewed and are negative. Prior to Visit Medications    Medication Sig Taking? Authorizing Provider   [facility] [id number] UNIT/GM powder  Yes Historical Provider, MD   amLODIPine (NORVASC) 10 MG tablet 1 tablet Yes Historical Provider, MD   acetaminophen (TYLENOL) 650 MG extended release tablet Take 650 mg by mouth every 8 hours as needed for Pain Yes Historical Provider, MD   warfarin (COUMADIN) 10 MG tablet Take 0.5-1 tablets by mouth daily as directed by [facility].  Yes Fay Perez MD   calcium acetate (PHOSLO) 667 MG capsule Take 667 mg by mouth 2 times daily (with meals) Yes Historical Provider, MD   vitamin B-12 (CYANOCOBALAMIN) 1000 MCG tablet Take 1,000 mcg by mouth daily Yes Historical Provider, MD   amitriptyline (ELAVIL) 75 MG tablet Take 75 mg by mouth nightly  Yes Historical Provider, MD       Social History     Tobacco Use    Smoking status: Never Smoker    Smokeless tobacco: Never Used    Tobacco comment: smoked cigars in [place] [address]'s   Substance Use Topics    Alcohol use: No    Drug use: No            RECORD REVIEW: Previous medical records were reviewed at today's visit. Wt Readings from Last 3 Encounters:   01/14/20 (!) 430 lb (195 kg)   10/07/19 (!) 413 lb (187.3 kg)   09/12/19 (!) 400 lb 6.4 oz (181.6 kg)       Results for orders placed or performed during the hospital encounter of 08/16/21   Protime-INR   Result Value Ref Range    INR 2.7     Protime 32.4 seconds       No results found. PHYSICAL EXAMINATION:  Due to this being a TeleHealth encounter, evaluation of the following organ systems is limited: Vitals/Constitutional/EENT/Resp/CV/GI//MS/Neuro/Skin/Heme-Lymph-Imm. Constitutional: [x] Appears well-developed and well-nourished. [x] Abnormal Obese  Mental status  [x] Alert and awake  [x] Oriented to person/place/time [x]Able to follow commands    [x] No apparent distress      Eyes:  EOM    [x]  Normal  [] Abnormal-  Sclera  [x]  Normal  [] Abnormal -         Discharge [x]  None visible  [] Abnormal -    HENT:   [x] Normocephalic, atraumatic. [] Abnormal shaped head   [x] Mouth/Throat: Mucous membranes are moist.     Ears [x] Normal  [] Abnormal-    Neck: [x] Normal range of motion [x] Supple [x] No visualized mass. Pulmonary/Chest: [x] Respiratory effort normal.  [x] No visualized signs of difficulty breathing or respiratory distress        [] Abnormal      Musculoskeletal:   [x] Normal range of motion. [x] Normal gait with no signs of ataxia. [x]  No signs of cyanosis of the peripheral portions of extremities.          [] Abnormal       Neurological:        [x] Normal cranial nerve (limited exam to video visit) [x] No focal weakness observed       [] Abnormal          Speech       [x] Normal   [] Abnormal     Skin:        [x] No rash on visible skin  [x] Normal  [] Abnormal     Psychiatric:       [x] Normal  [] Abnormal        [x] Normal Mood  [] Anxious appearing        Other Pertinent Exam Findings:       ASSESSMENT:  1. STEPH on CPAP    2. Class 3 severe obesity due to excess calories with serious comorbidity and body mass index (BMI) of 60.0 to 69.9 in adult ([facility] [address]    3. Abnormal x-ray of lungs with single pulmonary nodule          Plan:   Encourage CPAP use   Avoid sedative hypnotics and alcohol at bedtime  Weight loss  Encourage cautions when driving and other high risk activities of not adequately treated for sleep apnea  Instructed to bring CPAP machine for use post op  Flu shot when available. Return in 1 year. An  electronic signature was used to authenticate this note. --Kristina Grimes DO on 8/24/2021 at 10:39 AM    9}    Pursuant to the emergency declaration under the [facility], [facility] waiver authority and the [employer] Act, this Virtual  Visit was conducted, with patient's consent, to reduce the patient's risk of exposure to COVID-19 and provide continuity of care for an established patient. Services were provided through a video synchronous discussion virtually to substitute for in-person clinic visit.

## 2021-09-20 ENCOUNTER — HOSPITAL ENCOUNTER (OUTPATIENT)
Dept: PHARMACY | Age: 71
Setting detail: THERAPIES SERIES
Discharge: HOME | End: 2021-09-20
Payer: MEDICARE

## 2021-09-20 DIAGNOSIS — I82.499 DEEP VEIN THROMBOSIS (DVT) OF OTHER VEIN OF LOWER EXTREMITY, UNSPECIFIED CHRONICITY, UNSPECIFIED LATERALITY (HCC): Primary | ICD-10-CM

## 2021-09-20 LAB
INR BLD: 2.1
PROTIME: 25.6 SECONDS

## 2021-09-20 PROCEDURE — 85610 PROTHROMBIN TIME: CPT

## 2021-09-20 PROCEDURE — 99211 OFF/OP EST MAY X REQ PHY/QHP: CPT

## 2021-09-20 NOTE — PROGRESS NOTES
Patient seen in clinic for warfarin management due to DVT with an INR goal of 2.0-3.0. Estimated duration of therapy is indefinite. Patient states compliant all of the time with regimen. No bleeding or thromboembolic side effects noted. No significant med or dietary changes. No significant recent illness or disease state changes. PT/INR done in office per protocol. INR is 2.1 which is therapeutic. Warfarin regimen will be continued at current dose of 5mg Mon/Wed/Fri and 10mg all other days. Will retest in 4 weeks. Patient understands dosing directions and information discussed. Dosing schedule and follow up appointment given to patient. Progress note routed to referring physicians office. Discussed with patient the Pharmacist Collaborative Practice Agreement. Patient provided verbal and/or electronic (ex. [employer]art) consent to participate in the collaborative practice agreement between the pharmacist and referred patient. This is in lieu of paper consent due to COVID-19 precautions and the use of remote/virtual visits.        For Pharmacy Admin Tracking Only     Intervention Detail:     Total # of Interventions Recommended: 0   Total # of Interventions Accepted: 0   Time Spent (min): 15

## 2021-10-19 ENCOUNTER — HOSPITAL ENCOUNTER (OUTPATIENT)
Dept: PHARMACY | Age: 71
Setting detail: THERAPIES SERIES
Discharge: HOME | End: 2021-10-19
Payer: MEDICARE

## 2021-10-19 ENCOUNTER — APPOINTMENT (OUTPATIENT)
Dept: PHARMACY | Age: 71
End: 2021-10-19
Payer: MEDICARE

## 2021-10-19 LAB
INR BLD: 2.8
PROTIME: 33.4 SECONDS

## 2021-10-19 PROCEDURE — 99211 OFF/OP EST MAY X REQ PHY/QHP: CPT

## 2021-10-19 PROCEDURE — 85610 PROTHROMBIN TIME: CPT

## 2021-10-19 NOTE — PROGRESS NOTES
Patient seen in clinic for warfarin management due to DVT with an INR goal of 2.0-3.0. Estimated duration of therapy is indefinite. Patient states compliant all of the time with regimen. No bleeding or thromboembolic side effects noted. No significant med or dietary changes. No significant recent illness or disease state changes. PT/INR done in office per protocol. INR is 2.8 which is therapeutic. Warfarin regimen will be continued at current dose 5mg Mon/Wed/Fri, 10mg all other days. Will retest in 4 weeks. Patient understands dosing directions and information discussed. Dosing schedule and follow up appointment given to patient. Progress note routed to referring physicians office. Discussed with patient the Pharmacist Collaborative Practice Agreement. Patient provided verbal and/or electronic (ex. ShelfFliphart) consent to participate in the collaborative practice agreement between the pharmacist and referred patient. This is in lieu of paper consent due to COVID-19 precautions and the use of remote/virtual visits.        For Pharmacy Admin Tracking Only     Intervention Detail:    Total # of Interventions Recommended: 0   Total # of Interventions Accepted: 0   Time Spent (min): 15

## 2021-11-18 ENCOUNTER — HOSPITAL ENCOUNTER (OUTPATIENT)
Dept: PHARMACY | Age: 71
Setting detail: THERAPIES SERIES
Discharge: HOME | End: 2021-11-18
Payer: MEDICARE

## 2021-11-18 DIAGNOSIS — I82.499 DEEP VEIN THROMBOSIS (DVT) OF OTHER VEIN OF LOWER EXTREMITY, UNSPECIFIED CHRONICITY, UNSPECIFIED LATERALITY (HCC): Primary | ICD-10-CM

## 2021-11-18 LAB
INR BLD: 2.9
PROTIME: 35.3 SECONDS

## 2021-11-18 PROCEDURE — 85610 PROTHROMBIN TIME: CPT

## 2021-11-18 PROCEDURE — 99212 OFFICE O/P EST SF 10 MIN: CPT

## 2021-11-18 RX ORDER — WARFARIN SODIUM 10 MG/1
5-10 TABLET ORAL DAILY
Qty: 90 TABLET | Refills: 3 | OUTPATIENT
Start: 2021-11-18

## 2021-11-18 NOTE — PROGRESS NOTES
Patient seen in clinic for warfarin management due to DVT with an INR goal of 2.0-3.0. Estimated duration of therapy is indefinite. Patient states compliant all of the time with regimen. No bleeding or thromboembolic side effects noted. No significant med or dietary changes. No significant recent illness or disease state changes. PT/INR done in office per protocol. INR is 2.9 which is therapeutic. Warfarin regimen will be continued at current dose of 5mg Mon/Wed/Fri and 10mg all other days. Will retest in 5 weeks. Refill sent to [facility] per patient request.    Patient understands dosing directions and information discussed. Dosing schedule and follow up appointment given to patient. Progress note routed to referring physicians office. Discussed with patient the Pharmacist Collaborative Practice Agreement. Patient provided verbal and/or electronic (ex. [employer] Engineshart) consent to participate in the collaborative practice agreement between the pharmacist and referred patient. This is in lieu of paper consent due to COVID-19 precautions and the use of remote/virtual visits.        For Pharmacy Admin Tracking Only     Intervention Detail: Refill(s) Provided   Total # of Interventions Recommended: 1   Total # of Interventions Accepted: 1   Time Spent (min): 15

## 2021-12-23 ENCOUNTER — HOSPITAL ENCOUNTER (OUTPATIENT)
Dept: PHARMACY | Age: 71
Setting detail: THERAPIES SERIES
Discharge: HOME | End: 2021-12-23
Payer: MEDICARE

## 2021-12-23 DIAGNOSIS — I82.409 DEEP VEIN THROMBOSIS (DVT) OF LOWER EXTREMITY, UNSPECIFIED CHRONICITY, UNSPECIFIED LATERALITY, UNSPECIFIED VEIN (HCC): Primary | ICD-10-CM

## 2021-12-23 LAB
INR BLD: 2.5
PROTIME: 30.2 SECONDS

## 2021-12-23 PROCEDURE — 99211 OFF/OP EST MAY X REQ PHY/QHP: CPT

## 2021-12-23 PROCEDURE — 85610 PROTHROMBIN TIME: CPT

## 2021-12-23 NOTE — PROGRESS NOTES
Patient seen in clinic for warfarin management due to DVT with an INR goal of 2.0-3.0. Estimated duration of therapy is indefinite. Patient states compliant all of the time with regimen. No bleeding or thromboembolic side effects noted. No significant med or dietary changes. No significant recent illness or disease state changes. PT/INR done in office per protocol. INR is 2.5 which is therapeutic. Warfarin regimen will be continued at current dose 10mg daily except 5mg Mon/Wed/Fri. Will retest in 5 weeks. Patient understands dosing directions and information discussed. Dosing schedule and follow up appointment given to patient. Progress note routed to referring physicians office. Discussed with patient the Pharmacist Collaborative Practice Agreement. Patient provided verbal and/or electronic (ex. Vuv Analyticst) consent to participate in the collaborative practice agreement between the pharmacist and referred patient. This is in lieu of paper consent due to COVID-19 precautions and the use of remote/virtual visits.        For Pharmacy Admin Tracking Only     Intervention Detail:    Total # of Interventions Recommended: 0   Total # of Interventions Accepted: 0   Time Spent (min): 15

## 2022-02-01 ENCOUNTER — HOSPITAL ENCOUNTER (OUTPATIENT)
Dept: PHARMACY | Age: 72
Setting detail: THERAPIES SERIES
Discharge: HOME | End: 2022-02-01
Payer: MEDICARE

## 2022-02-01 DIAGNOSIS — I82.409 DEEP VEIN THROMBOSIS (DVT) OF LOWER EXTREMITY, UNSPECIFIED CHRONICITY, UNSPECIFIED LATERALITY, UNSPECIFIED VEIN (HCC): Primary | ICD-10-CM

## 2022-02-01 LAB
INR BLD: 3.4
PROTIME: 40.3 SECONDS

## 2022-02-01 PROCEDURE — 99212 OFFICE O/P EST SF 10 MIN: CPT

## 2022-02-01 PROCEDURE — 85610 PROTHROMBIN TIME: CPT

## 2022-02-01 NOTE — PROGRESS NOTES
Patient seen in clinic for warfarin management due to DVT with an INR goal of 2.0-3.0. Estimated duration of therapy is indefinite. Patient states compliant all of the time with regimen. No bleeding or thromboembolic side effects noted. No significant dietary changes. Patient has been taking IBURPROFEN 800mg recently for knee pain. He will discontinue and try Arthritis strength APAP instead. No significant recent illness or disease state changes. PT/INR done in office per protocol. INR is 3.4 which is supratherapeutic. Warfarin regimen will be held for today then continue 5mg Mon/Wed/Fri and 10mg all other days. Will retest in 3 weeks to assess. Patient understands dosing directions and information discussed. Dosing schedule and follow up appointment given to patient. Progress note routed to referring physicians office. Discussed with patient the Pharmacist Collaborative Practice Agreement. Patient provided verbal and/or electronic (ex. Artsiclehart) consent to participate in the collaborative practice agreement between the pharmacist and referred patient. This is in lieu of paper consent due to COVID-19 precautions and the use of remote/virtual visits.        For Pharmacy Admin Tracking Only     Intervention Detail: Dose Adjustment: 1, reason: Therapy De-escalation   Total # of Interventions Recommended: 1   Total # of Interventions Accepted: 1   Time Spent (min): 15

## 2022-02-11 ENCOUNTER — VIRTUAL VISIT (OUTPATIENT)
Dept: PULMONOLOGY | Age: 72
End: 2022-02-11
Payer: MEDICARE

## 2022-02-11 DIAGNOSIS — G47.33 OSA ON CPAP: Primary | ICD-10-CM

## 2022-02-11 DIAGNOSIS — E66.01 CLASS 3 SEVERE OBESITY DUE TO EXCESS CALORIES WITH SERIOUS COMORBIDITY AND BODY MASS INDEX (BMI) OF 60.0 TO 69.9 IN ADULT (HCC): ICD-10-CM

## 2022-02-11 DIAGNOSIS — M17.4 OTHER SECONDARY OSTEOARTHRITIS OF BOTH KNEES: ICD-10-CM

## 2022-02-11 DIAGNOSIS — Z99.89 OSA ON CPAP: Primary | ICD-10-CM

## 2022-02-11 PROCEDURE — 4040F PNEUMOC VAC/ADMIN/RCVD: CPT | Performed by: INTERNAL MEDICINE

## 2022-02-11 PROCEDURE — 3017F COLORECTAL CA SCREEN DOC REV: CPT | Performed by: INTERNAL MEDICINE

## 2022-02-11 PROCEDURE — G8421 BMI NOT CALCULATED: HCPCS | Performed by: INTERNAL MEDICINE

## 2022-02-11 PROCEDURE — 99213 OFFICE O/P EST LOW 20 MIN: CPT | Performed by: INTERNAL MEDICINE

## 2022-02-11 PROCEDURE — 1036F TOBACCO NON-USER: CPT | Performed by: INTERNAL MEDICINE

## 2022-02-11 PROCEDURE — G8427 DOCREV CUR MEDS BY ELIG CLIN: HCPCS | Performed by: INTERNAL MEDICINE

## 2022-02-11 PROCEDURE — 1123F ACP DISCUSS/DSCN MKR DOCD: CPT | Performed by: INTERNAL MEDICINE

## 2022-02-11 PROCEDURE — G8484 FLU IMMUNIZE NO ADMIN: HCPCS | Performed by: INTERNAL MEDICINE

## 2022-02-11 ASSESSMENT — SLEEP AND FATIGUE QUESTIONNAIRES
HOW LIKELY ARE YOU TO NOD OFF OR FALL ASLEEP WHILE SITTING AND READING: 0
HOW LIKELY ARE YOU TO NOD OFF OR FALL ASLEEP WHILE WATCHING TV: 0
HOW LIKELY ARE YOU TO NOD OFF OR FALL ASLEEP WHILE LYING DOWN TO REST IN THE AFTERNOON WHEN CIRCUMSTANCES PERMIT: 2
HOW LIKELY ARE YOU TO NOD OFF OR FALL ASLEEP WHEN YOU ARE A PASSENGER IN A CAR FOR AN HOUR WITHOUT A BREAK: 0
HOW LIKELY ARE YOU TO NOD OFF OR FALL ASLEEP WHILE SITTING QUIETLY AFTER LUNCH WITHOUT ALCOHOL: 0
HOW LIKELY ARE YOU TO NOD OFF OR FALL ASLEEP WHILE SITTING AND TALKING TO SOMEONE: 0
HOW LIKELY ARE YOU TO NOD OFF OR FALL ASLEEP WHILE SITTING INACTIVE IN A PUBLIC PLACE: 0
ESS TOTAL SCORE: 2
HOW LIKELY ARE YOU TO NOD OFF OR FALL ASLEEP IN A CAR, WHILE STOPPED FOR A FEW MINUTES IN TRAFFIC: 0

## 2022-02-12 NOTE — PROGRESS NOTES
2022    TELEHEALTH EVALUATION -- Audio/Visual (During XBNGM-49 public health emergency)    Patient and physician are located in their individual locations. This is visit is completed via Mandic application []/ Doxy. me[] / Telephone [x]     HPI:     Quintin Gaucher (:  1950) has requested an audio/video evaluation for the following concern(s):    Patient returns for follow up of sleep apnea. Since last visit 12 months ago, patient has been very compliant with CPAP. Uses every night, for the entire night. Reports refreshing and restorative sleep. Denies snoring. Reports normal daytime alertness. Believes benefiting greatly. Compliance download from 21 to 21 shows 100% compliance for average of 7hrs per night. Residual AHI 5.7. Mean rhpnkmiv40.7 cm H2O. Patient states he received both of his COVID vaccinations plus booster. Has difficulty getting around because of his weight. Earlie Breech down and had to call the fire department to get him back up again. No injury. Review of Systems    Prior to Visit Medications    Medication Sig Taking? Authorizing Provider   warfarin (COUMADIN) 10 MG tablet Take 0.5-1 tablets by mouth daily as directed by [facility].  Yes Tameka Lord MD   [facility] [id number] UNIT/GM powder  Yes Historical Provider, MD   amLODIPine (NORVASC) 10 MG tablet 1 tablet Yes Historical Provider, MD   acetaminophen (TYLENOL) 650 MG extended release tablet Take 650 mg by mouth every 8 hours as needed for Pain Yes Historical Provider, MD   calcium acetate (PHOSLO) 667 MG capsule Take 667 mg by mouth 2 times daily (with meals) Yes Historical Provider, MD   vitamin B-12 (CYANOCOBALAMIN) 1000 MCG tablet Take 1,000 mcg by mouth daily Yes Historical Provider, MD   amitriptyline (ELAVIL) 75 MG tablet Take 75 mg by mouth nightly  Yes Historical Provider, MD       Social History     Tobacco Use    Smoking status: Never Smoker    Smokeless tobacco: Never Used   [address] [place] [address] Sen Tobacco comment: smoked cigars in earky 20's   Vaping Use    Vaping Use: Never used   Substance Use Topics    Alcohol use: No    Drug use: No            RECORD REVIEW: Previous medical records were reviewed at today's visit. Wt Readings from Last 3 Encounters:   01/14/20 (!) 430 lb (195 kg)   10/07/19 (!) 413 lb (187.3 kg)   09/12/19 (!) 400 lb 6.4 oz (181.6 kg)       Results for orders placed or performed during the hospital encounter of 02/01/22   Protime-INR   Result Value Ref Range    INR 3.4     Protime 40.3 seconds       No results found. PHYSICAL EXAMINATION:  Due to this being a TeleHealth encounter, evaluation of the following organ systems is limited:           ASSESSMENT:  1. STEPH on CPAP    2. Class 3 severe obesity due to excess calories with serious comorbidity and body mass index (BMI) of 60.0 to 69.9 in adult (HCC)    3. Pulmonary nodule less than 6 cm determined by computed tomography of lung    4. Other secondary osteoarthritis of both knees          Plan:   Encourage CPAP use   Avoid sedative hypnotics and alcohol at bedtime  Weight loss  Exercise caution when driving and other high risk activities of not adequately treated for sleep apnea  Instructed to bring CPAP machine for use post op  Return in 1 year. An  electronic signature was used to authenticate this note. --Dorthula Sacks,  on 2/11/2022 at 10:38 PM    9}    Pursuant to the emergency declaration under the [address] [place] [address], [facility] waiver authority and the [employer] Act, this Virtual  Visit was conducted, with patient's consent, to reduce the patient's risk of exposure to COVID-19 and provide continuity of care for an established patient. Services were provided through a video synchronous discussion virtually to substitute for in-person clinic visit. _______________________________________________________________________________________________________________________________________________  FOR TELEPHONE VISITS PLEASE COMPLETE THE FOLLOWING      Consent:  He and/or health care decision maker is aware that that he may receive a bill for this telephone service, depending on his insurance coverage, and has provided verbal consent to proceed: Yes      I affirm this is a Patient Initiated Episode with an Established Patient who has not had a related appointment within my department in the past 7 days or scheduled within the next 24 hours.     Total Time: minutes: 11-20 minutes    Note: not billable if this call serves to triage the patient into an appointment for the relevant concern

## 2022-02-22 ENCOUNTER — HOSPITAL ENCOUNTER (OUTPATIENT)
Dept: PHARMACY | Age: 72
Setting detail: THERAPIES SERIES
Discharge: HOME | End: 2022-02-22
Payer: MEDICARE

## 2022-02-22 DIAGNOSIS — I82.409 DEEP VEIN THROMBOSIS (DVT) OF LOWER EXTREMITY, UNSPECIFIED CHRONICITY, UNSPECIFIED LATERALITY, UNSPECIFIED VEIN (HCC): Primary | ICD-10-CM

## 2022-02-22 LAB
INR BLD: 1.9
PROTIME: 22.5 SECONDS

## 2022-02-22 PROCEDURE — 99211 OFF/OP EST MAY X REQ PHY/QHP: CPT

## 2022-02-22 PROCEDURE — 85610 PROTHROMBIN TIME: CPT

## 2022-02-22 NOTE — PROGRESS NOTES
Patient seen in clinic for warfarin management due to DVT with an INR goal of 2.0-3.0. Estimated duration of therapy is indefinite. Patient states compliant all of the time with regimen. No bleeding or thromboembolic side effects noted. No significant med or dietary changes. No significant recent illness or disease state changes. PT/INR done in office per protocol. INR is 1.9 which is just below goal.     Warfarin regimen will be continued at current dose 10mg daily except 5mg Mon/Wed/Fri. Will retest in 4 weeks. Patient understands dosing directions and information discussed. Dosing schedule and follow up appointment given to patient. Progress note routed to referring physicians office. Discussed with patient the Pharmacist Collaborative Practice Agreement. Patient provided verbal and/or electronic (ex. Workanahart) consent to participate in the collaborative practice agreement between the pharmacist and referred patient. This is in lieu of paper consent due to COVID-19 precautions and the use of remote/virtual visits.        For Pharmacy Admin Tracking Only     Intervention Detail:    Total # of Interventions Recommended: 0   Total # of Interventions Accepted: 0   Time Spent (min): 15

## 2022-03-24 ENCOUNTER — HOSPITAL ENCOUNTER (OUTPATIENT)
Dept: PHARMACY | Age: 72
Setting detail: THERAPIES SERIES
Discharge: HOME | End: 2022-03-24
Payer: MEDICARE

## 2022-03-24 DIAGNOSIS — I82.4Y9 DEEP VEIN THROMBOSIS (DVT) OF PROXIMAL LOWER EXTREMITY, UNSPECIFIED CHRONICITY, UNSPECIFIED LATERALITY (HCC): Primary | ICD-10-CM

## 2022-03-24 LAB
INR BLD: 2.1
PROTIME: 25.3 SECONDS

## 2022-03-24 PROCEDURE — 85610 PROTHROMBIN TIME: CPT

## 2022-03-24 PROCEDURE — 99211 OFF/OP EST MAY X REQ PHY/QHP: CPT

## 2022-03-24 NOTE — PROGRESS NOTES
Patient seen in clinic for warfarin management due to DVT with an INR goal of 2.0-3.0. Estimated duration of therapy is indefinite. Patient states compliant all of the time with regimen. No bleeding or thromboembolic side effects noted. No significant med or dietary changes. No significant recent illness or disease state changes. PT/INR done in office per protocol. INR is 2.1 which is therapeutic. Warfarin regimen will be continued at current dose 10mg daily except 5mg Mon/Wed/Fri. Will retest in 4 weeks. Patient understands dosing directions and information discussed. Dosing schedule and follow up appointment given to patient. Progress note routed to referring physicians office. Discussed with patient the Pharmacist Collaborative Practice Agreement. Patient provided verbal and/or electronic (ex. FINDING ROVERt) consent to participate in the collaborative practice agreement between the pharmacist and referred patient. This is in lieu of paper consent due to COVID-19 precautions and the use of remote/virtual visits.        For Pharmacy Admin Tracking Only     Intervention Detail:   Total # of Interventions Recommended: 0   Total # of Interventions Accepted: 0   Time Spent (min): 15

## 2022-04-21 ENCOUNTER — APPOINTMENT (OUTPATIENT)
Dept: PHARMACY | Age: 72
End: 2022-04-21
Payer: MEDICARE

## 2022-04-26 ENCOUNTER — HOSPITAL ENCOUNTER (OUTPATIENT)
Dept: PHARMACY | Age: 72
Setting detail: THERAPIES SERIES
Discharge: HOME | End: 2022-04-26
Payer: MEDICARE

## 2022-04-26 DIAGNOSIS — I82.409 DEEP VEIN THROMBOSIS (DVT) OF LOWER EXTREMITY, UNSPECIFIED CHRONICITY, UNSPECIFIED LATERALITY, UNSPECIFIED VEIN (HCC): Primary | ICD-10-CM

## 2022-04-26 LAB
INR BLD: 2.4
PROTIME: 28.2 SECONDS

## 2022-04-26 PROCEDURE — 99211 OFF/OP EST MAY X REQ PHY/QHP: CPT

## 2022-04-26 PROCEDURE — 85610 PROTHROMBIN TIME: CPT

## 2022-04-26 NOTE — PROGRESS NOTES
Patient seen in clinic for warfarin management due to DVT with an INR goal of 2.0-3.0. Estimated duration of therapy is indefinite. Patient states compliant all of the time with regimen. No bleeding or thromboembolic side effects noted. No significant med or dietary changes. No significant recent illness or disease state changes. PT/INR done in office per protocol. INR is 2.4 which is therapeutic. Warfarin regimen will be continued at current dose of 5mg MWF and 10mg on all other days. Will retest in 4 weeks. Patient understands dosing directions and information discussed. Dosing schedule and follow up appointment given to patient. Progress note routed to referring physicians office. Discussed with patient the Pharmacist Collaborative Practice Agreement. Patient provided verbal and/or electronic (ex. EverySignalt) consent to participate in the collaborative practice agreement between the pharmacist and referred patient. This is in lieu of paper consent due to COVID-19 precautions and the use of remote/virtual visits.        For Pharmacy Admin Tracking Only     Intervention Detail:    Total # of Interventions Recommended: 0   Total # of Interventions Accepted: 0   Time Spent (min): 15

## 2022-05-24 ENCOUNTER — HOSPITAL ENCOUNTER (OUTPATIENT)
Dept: PHARMACY | Age: 72
Setting detail: THERAPIES SERIES
Discharge: HOME | End: 2022-05-24
Payer: MEDICARE

## 2022-05-24 DIAGNOSIS — I82.409 DEEP VEIN THROMBOSIS (DVT) OF LOWER EXTREMITY, UNSPECIFIED CHRONICITY, UNSPECIFIED LATERALITY, UNSPECIFIED VEIN (HCC): Primary | ICD-10-CM

## 2022-05-24 LAB
INR BLD: 1.9
PROTIME: 23.1 SECONDS

## 2022-05-24 PROCEDURE — 85610 PROTHROMBIN TIME: CPT

## 2022-05-24 PROCEDURE — 99211 OFF/OP EST MAY X REQ PHY/QHP: CPT

## 2022-05-24 NOTE — PROGRESS NOTES
Patient seen in clinic for warfarin management due to DVT with an INR goal of 2.0-3.0. Estimated duration of therapy is indefinite. Patient states compliant all of the time with regimen. No bleeding or thromboembolic side effects noted. Patient has resumed taking his Keto pills in the evening and is reducing carb and overall food intake. No significant recent illness or disease state changes. PT/INR done in office per protocol. INR is 1.9 which is just below goal.     Warfarin regimen will be continued at current dose of 5mg Mon/Wed/Fri and 10mg all other days. Will retest in 4 weeks. Patient is due for Pneumovax 23 and would like to receive this in clinic at his next visit. Patient understands dosing directions and information discussed. Dosing schedule and follow up appointment given to patient. Progress note routed to referring physicians office. Discussed with patient the Pharmacist Collaborative Practice Agreement. Patient provided verbal and/or electronic (ex. Teladochart) consent to participate in the collaborative practice agreement between the pharmacist and referred patient. This is in lieu of paper consent due to COVID-19 precautions and the use of remote/virtual visits.        For Pharmacy Admin Tracking Only     Intervention Detail:    Total # of Interventions Recommended: 0   Total # of Interventions Accepted: 0   Time Spent (min): 15

## 2022-06-22 ENCOUNTER — HOSPITAL ENCOUNTER (OUTPATIENT)
Dept: PHARMACY | Age: 72
Setting detail: THERAPIES SERIES
Discharge: HOME | End: 2022-06-22
Payer: MEDICARE

## 2022-06-22 DIAGNOSIS — I82.409 DEEP VEIN THROMBOSIS (DVT) OF LOWER EXTREMITY, UNSPECIFIED CHRONICITY, UNSPECIFIED LATERALITY, UNSPECIFIED VEIN (HCC): Primary | ICD-10-CM

## 2022-06-22 LAB
INR BLD: 1.9
PROTIME: 23 SECONDS

## 2022-06-22 PROCEDURE — 90732 PPSV23 VACC 2 YRS+ SUBQ/IM: CPT | Performed by: INTERNAL MEDICINE

## 2022-06-22 PROCEDURE — G0009 ADMIN PNEUMOCOCCAL VACCINE: HCPCS | Performed by: INTERNAL MEDICINE

## 2022-06-22 PROCEDURE — 6360000002 HC RX W HCPCS: Performed by: INTERNAL MEDICINE

## 2022-06-22 PROCEDURE — 99211 OFF/OP EST MAY X REQ PHY/QHP: CPT

## 2022-06-22 PROCEDURE — 85610 PROTHROMBIN TIME: CPT

## 2022-06-22 RX ADMIN — PNEUMOCOCCAL VACCINE POLYVALENT 0.5 ML
25; 25; 25; 25; 25; 25; 25; 25; 25; 25; 25; 25; 25; 25; 25; 25; 25; 25; 25; 25; 25; 25; 25 INJECTION, SOLUTION INTRAMUSCULAR; SUBCUTANEOUS at 12:04

## 2022-06-22 NOTE — PROGRESS NOTES
Patient seen in clinic for warfarin management due to DVT with an INR goal of 2.0-3.0. Estimated duration of therapy is indefinite. Patient states he took 5mg yesterday instead of instructed dose of 10mg as he was worried INR would be high today. No bleeding or thromboembolic side effects noted. No significant med or dietary changes. No significant recent illness or disease state changes. PT/INR done in office per protocol. INR is 1.9 which is just below goal.     Warfarin regimen will be continued at current dose of . Will retest in 4 weeks. Patient understands dosing directions and information discussed. Dosing schedule and follow up appointment given to patient. Progress note routed to referring physicians office. Discussed with patient the Pharmacist Collaborative Practice Agreement. Patient provided verbal and/or electronic (ex. SimilarWebhart) consent to participate in the collaborative practice agreement between the pharmacist and referred patient. This is in lieu of paper consent due to COVID-19 precautions and the use of remote/virtual visits.        For Pharmacy Admin Tracking Only     Intervention Detail: Vaccine Recommended/Administered   Total # of Interventions Recommended: 1   Total # of Interventions Accepted: 1   Time Spent (min): 15

## 2022-07-20 ENCOUNTER — HOSPITAL ENCOUNTER (OUTPATIENT)
Dept: PHARMACY | Age: 72
Setting detail: THERAPIES SERIES
Discharge: HOME | End: 2022-07-20
Payer: MEDICARE

## 2022-07-20 DIAGNOSIS — I82.499 DEEP VEIN THROMBOSIS (DVT) OF OTHER VEIN OF LOWER EXTREMITY, UNSPECIFIED CHRONICITY, UNSPECIFIED LATERALITY (HCC): Primary | ICD-10-CM

## 2022-07-20 LAB
INR BLD: 1.6
PROTIME: 19 SECONDS

## 2022-07-20 PROCEDURE — 99211 OFF/OP EST MAY X REQ PHY/QHP: CPT

## 2022-07-20 PROCEDURE — 85610 PROTHROMBIN TIME: CPT

## 2022-07-20 PROCEDURE — 99212 OFFICE O/P EST SF 10 MIN: CPT

## 2022-08-10 ENCOUNTER — HOSPITAL ENCOUNTER (OUTPATIENT)
Dept: PHARMACY | Age: 72
Setting detail: THERAPIES SERIES
Discharge: HOME | End: 2022-08-10
Payer: MEDICARE

## 2022-08-10 DIAGNOSIS — I82.499 DEEP VEIN THROMBOSIS (DVT) OF OTHER VEIN OF LOWER EXTREMITY, UNSPECIFIED CHRONICITY, UNSPECIFIED LATERALITY (HCC): Primary | ICD-10-CM

## 2022-08-10 LAB
INR BLD: 1.7
PROTIME: 20.7 SECONDS

## 2022-08-10 PROCEDURE — 99212 OFFICE O/P EST SF 10 MIN: CPT

## 2022-08-10 PROCEDURE — 85610 PROTHROMBIN TIME: CPT

## 2022-08-10 NOTE — PROGRESS NOTES
Patient seen in clinic for warfarin management due to DVT with an INR goal of 2.0-3.0. Estimated duration of therapy is indefinite. Patient states compliant all of the time with regimen. No bleeding or thromboembolic side effects noted. No significant med changes. Patient states he and his wife have been watching carb intake so he has been eating more healthy overall. No significant recent illness or disease state changes. PT/INR done in office per protocol. INR is 1.7 which is subtherapeutic. Warfarin regimen will be increased to 5mg Mon/Fri and 10mg all other days. Will retest in 3 weeks as patient does not wish to return in 2 weeks. Patient understands dosing directions and information discussed. Dosing schedule and follow up appointment given to patient. Progress note routed to referring physicians office. Discussed with patient the Pharmacist Collaborative Practice Agreement. Patient provided verbal and/or electronic (ex. Justworkshart) consent to participate in the collaborative practice agreement between the pharmacist and referred patient. This is in lieu of paper consent due to COVID-19 precautions and the use of remote/virtual visits.        For Pharmacy Admin Tracking Only    Intervention Detail: Dose Adjustment: 1, reason: Therapy Optimization  Total # of Interventions Recommended: 1  Total # of Interventions Accepted: 1  Time Spent (min): 15

## 2022-08-31 ENCOUNTER — HOSPITAL ENCOUNTER (OUTPATIENT)
Dept: PHARMACY | Age: 72
Setting detail: THERAPIES SERIES
Discharge: HOME | End: 2022-08-31
Payer: MEDICARE

## 2022-08-31 DIAGNOSIS — I82.499 DEEP VEIN THROMBOSIS (DVT) OF OTHER VEIN OF LOWER EXTREMITY, UNSPECIFIED CHRONICITY, UNSPECIFIED LATERALITY (HCC): Primary | ICD-10-CM

## 2022-08-31 PROCEDURE — 85610 PROTHROMBIN TIME: CPT

## 2022-08-31 PROCEDURE — 99211 OFF/OP EST MAY X REQ PHY/QHP: CPT

## 2022-08-31 NOTE — PROGRESS NOTES
Patient seen in clinic for warfarin management due to DVT with an INR goal of 2.0-3.0. Estimated duration of therapy is indefinite. Patient states  he has been taking 5mg MWF and 10mg all other days instead of boosted dose of 5mg mon/fri and 10mg all other days . No bleeding or thromboembolic side effects noted. No significant med or dietary changes. No significant recent illness or disease state changes. PT/INR done in office per protocol. INR is 2.1 which is therapeutic. Warfarin regimen will be continued at current dose of 5mg MWF and 10mg all other days. Will retest in 4 weeks. Patient understands dosing directions and information discussed. Dosing schedule and follow up appointment given to patient. Progress note routed to referring physicians office. Discussed with patient the Pharmacist Collaborative Practice Agreement. Patient provided verbal and/or electronic (ex. Dianahart) consent to participate in the collaborative practice agreement between the pharmacist and referred patient. This is in lieu of paper consent due to COVID-19 precautions and the use of remote/virtual visits.        For Pharmacy Admin Tracking Only    Intervention Detail: Adherence Monitorin  Total # of Interventions Recommended: 1  Total # of Interventions Accepted: 1  Time Spent (min): 15

## 2022-09-27 ENCOUNTER — HOSPITAL ENCOUNTER (OUTPATIENT)
Dept: PHARMACY | Age: 72
Setting detail: THERAPIES SERIES
Discharge: HOME | End: 2022-09-27
Payer: MEDICARE

## 2022-09-27 DIAGNOSIS — I82.499 DEEP VEIN THROMBOSIS (DVT) OF OTHER VEIN OF LOWER EXTREMITY, UNSPECIFIED CHRONICITY, UNSPECIFIED LATERALITY (HCC): Primary | ICD-10-CM

## 2022-09-27 LAB
INR BLD: 2.1
PROTIME: 25.5 SECONDS

## 2022-09-27 PROCEDURE — 99211 OFF/OP EST MAY X REQ PHY/QHP: CPT

## 2022-09-27 PROCEDURE — 85610 PROTHROMBIN TIME: CPT

## 2022-09-27 NOTE — PROGRESS NOTES
Patient seen in clinic for warfarin management due to DVT with an INR goal of 2.0-3.0. Estimated duration of therapy is indefinite. Patient states compliant all of the time with regimen. No bleeding or thromboembolic side effects noted. No significant med or dietary changes. No significant recent illness or disease state changes. PT/INR done in office per protocol. INR is 2.1 which is therapeutic. Warfarin regimen will be continued at current dose of 5mg Mon/Wed/Fri and 10mg all other days. Will retest in 4 weeks. Patient understands dosing directions and information discussed. Dosing schedule and follow up appointment given to patient. Progress note routed to referring physicians office. Discussed with patient the Pharmacist Collaborative Practice Agreement. Patient provided verbal and/or electronic (ex. Goomzeehart) consent to participate in the collaborative practice agreement between the pharmacist and referred patient. This is in lieu of paper consent due to COVID-19 precautions and the use of remote/virtual visits.        For Pharmacy Admin Tracking Only    Intervention Detail:   Total # of Interventions Recommended: 0  Total # of Interventions Accepted: 0  Time Spent (min): 15

## 2022-10-25 ENCOUNTER — HOSPITAL ENCOUNTER (OUTPATIENT)
Dept: PHARMACY | Age: 72
Setting detail: THERAPIES SERIES
Discharge: HOME | End: 2022-10-25
Payer: MEDICARE

## 2022-10-25 DIAGNOSIS — I82.499 DEEP VEIN THROMBOSIS (DVT) OF OTHER VEIN OF LOWER EXTREMITY, UNSPECIFIED CHRONICITY, UNSPECIFIED LATERALITY (HCC): Primary | ICD-10-CM

## 2022-10-25 LAB
INR BLD: 1.6
PROTIME: 19.2 SECONDS

## 2022-10-25 PROCEDURE — 99212 OFFICE O/P EST SF 10 MIN: CPT

## 2022-10-25 PROCEDURE — 85610 PROTHROMBIN TIME: CPT

## 2022-10-25 NOTE — PROGRESS NOTES
Patient seen in clinic for warfarin management due to DVT with an INR goal of 2.0-3.0. Estimated duration of therapy is indefinite. Patient states compliant all of the time with regimen. No bleeding or thromboembolic side effects noted. No significant med changes. No significant recent illness or disease state changes. He states he did eat broccoli the last couple of days which he usually doesn't do. PT/INR done in office per protocol. INR is 1.6 which is subtherapeutic from recent increase in dietary vitamin K. Warfarin regimen will be 10mg tomorrow then resume usual regimen of 5mg Mon/Wed/Fri, 10mg all other days as he is back to his usual eating habits. Will retest in 3 weeks. Patient understands dosing directions and information discussed. Dosing schedule and follow up appointment given to patient. Progress note routed to referring physicians office. Discussed with patient the Pharmacist Collaborative Practice Agreement. Patient provided verbal and/or electronic (ex. Club Venithart) consent to participate in the collaborative practice agreement between the pharmacist and referred patient. This is in lieu of paper consent due to COVID-19 precautions and the use of remote/virtual visits.        For Pharmacy Admin Tracking Only    Intervention Detail: Dose Adjustment: 1, reason: Therapy Optimization  Total # of Interventions Recommended: 1  Total # of Interventions Accepted: 1  Time Spent (min): 15

## 2022-11-15 ENCOUNTER — HOSPITAL ENCOUNTER (OUTPATIENT)
Dept: PHARMACY | Age: 72
Setting detail: THERAPIES SERIES
Discharge: HOME | End: 2022-11-15
Payer: MEDICARE

## 2022-11-15 DIAGNOSIS — I82.499 DEEP VEIN THROMBOSIS (DVT) OF OTHER VEIN OF LOWER EXTREMITY, UNSPECIFIED CHRONICITY, UNSPECIFIED LATERALITY (HCC): Primary | ICD-10-CM

## 2022-11-15 LAB
INR BLD: 1.8
PROTIME: 21.2 SECONDS

## 2022-11-15 PROCEDURE — 99211 OFF/OP EST MAY X REQ PHY/QHP: CPT

## 2022-11-15 PROCEDURE — 85610 PROTHROMBIN TIME: CPT

## 2022-11-15 NOTE — PROGRESS NOTES
Patient seen in clinic for warfarin management due to DVT with an INR goal of 2.0-3.0. Estimated duration of therapy is indefinite. Patient states compliant all of the time with regimen. No bleeding or thromboembolic side effects noted. No significant med or dietary changes. No significant recent illness or disease state changes. PT/INR done in office per protocol. INR is 1.8 which is just below goal.     Warfarin regimen will be slightly increased to 5mg Mon/Fri, 10mg all other days to bring INR more mid-range. Will retest in 4 weeks. Patient understands dosing directions and information discussed. Dosing schedule and follow up appointment given to patient. Progress note routed to referring physicians office. Discussed with patient the Pharmacist Collaborative Practice Agreement. Patient provided verbal and/or electronic (ex. [employer]t) consent to participate in the collaborative practice agreement between the pharmacist and referred patient. This is in lieu of paper consent due to COVID-19 precautions and the use of remote/virtual visits.        For Pharmacy Admin Tracking Only    Intervention Detail: Dose Adjustment: 1, reason: Therapy Optimization  Total # of Interventions Recommended: 1  Total # of Interventions Accepted: 1  Time Spent (min): 15

## 2022-12-13 ENCOUNTER — APPOINTMENT (OUTPATIENT)
Dept: PHARMACY | Age: 72
End: 2022-12-13
Payer: MEDICARE

## 2022-12-14 ENCOUNTER — HOSPITAL ENCOUNTER (OUTPATIENT)
Dept: PHARMACY | Age: 72
Setting detail: THERAPIES SERIES
Discharge: HOME | End: 2022-12-14
Payer: MEDICARE

## 2022-12-14 DIAGNOSIS — I82.499 DEEP VEIN THROMBOSIS (DVT) OF OTHER VEIN OF LOWER EXTREMITY, UNSPECIFIED CHRONICITY, UNSPECIFIED LATERALITY (HCC): Primary | ICD-10-CM

## 2022-12-14 LAB
INR BLD: 2
PROTIME: 24.5 SECONDS

## 2022-12-14 PROCEDURE — 85610 PROTHROMBIN TIME: CPT

## 2022-12-14 PROCEDURE — 99211 OFF/OP EST MAY X REQ PHY/QHP: CPT

## 2023-01-10 ENCOUNTER — HOSPITAL ENCOUNTER (OUTPATIENT)
Dept: PHARMACY | Age: 73
Setting detail: THERAPIES SERIES
Discharge: HOME | End: 2023-01-10
Payer: MEDICARE

## 2023-01-10 DIAGNOSIS — I82.499 DEEP VEIN THROMBOSIS (DVT) OF OTHER VEIN OF LOWER EXTREMITY, UNSPECIFIED CHRONICITY, UNSPECIFIED LATERALITY (HCC): Primary | ICD-10-CM

## 2023-01-10 LAB
INR BLD: 2.1
PROTIME: 25.1 SECONDS

## 2023-01-10 PROCEDURE — 99211 OFF/OP EST MAY X REQ PHY/QHP: CPT

## 2023-01-10 PROCEDURE — 85610 PROTHROMBIN TIME: CPT

## 2023-01-10 NOTE — PROGRESS NOTES
Patient seen in clinic for warfarin management due to DVT with an INR goal of 2.0-3.0. Estimated duration of therapy is indefinite. Patient states compliant all of the time with regimen. No bleeding or thromboembolic side effects noted. No significant med or dietary changes. No significant recent illness or disease state changes. PT/INR done in office per protocol. INR is 2.1 which is therapeutic. Warfarin regimen will be continued at current dose 5 mg Mon/Fri and 10 mg all other days. Will retest in 4 weeks. Patient understands dosing directions and information discussed. Dosing schedule and follow up appointment given to patient. Progress note routed to referring physicians office. Discussed with patient the Pharmacist Collaborative Practice Agreement. Patient provided verbal and/or electronic (ex. XMS Penvisiont) consent to participate in the collaborative practice agreement between the pharmacist and referred patient. This is in lieu of paper consent due to COVID-19 precautions and the use of remote/virtual visits.        For Pharmacy Admin Tracking Only    Intervention Detail:   Total # of Interventions Recommended: 0  Total # of Interventions Accepted: 0  Time Spent (min): 15

## 2023-01-11 ENCOUNTER — APPOINTMENT (OUTPATIENT)
Dept: PHARMACY | Age: 73
End: 2023-01-11
Payer: MEDICARE

## 2023-02-07 ENCOUNTER — HOSPITAL ENCOUNTER (OUTPATIENT)
Dept: PHARMACY | Age: 73
Setting detail: THERAPIES SERIES
Discharge: HOME | End: 2023-02-07
Payer: MEDICARE

## 2023-02-07 DIAGNOSIS — I82.499 DEEP VEIN THROMBOSIS (DVT) OF OTHER VEIN OF LOWER EXTREMITY, UNSPECIFIED CHRONICITY, UNSPECIFIED LATERALITY (HCC): Primary | ICD-10-CM

## 2023-02-07 LAB
INR BLD: 2.9
PROTIME: 34.8 SECONDS

## 2023-02-07 PROCEDURE — 85610 PROTHROMBIN TIME: CPT

## 2023-02-07 PROCEDURE — 99211 OFF/OP EST MAY X REQ PHY/QHP: CPT

## 2023-02-07 NOTE — PROGRESS NOTES
Patient seen in clinic for warfarin management due to DVT with an INR goal of 2.0-3.0. Estimated duration of therapy is indefinite. Patient states compliant all of the time with regimen. Patient did accidentally take a whole tablet instead of half a tablet for last night's dose. Recommendation was given to take half a tablet instead of whole for today's dose and resume regimen as normal.No bleeding or thromboembolic side effects noted. No significant med or dietary changes. No significant recent illness or disease state changes. PT/INR done in office per protocol. INR is 2.9 which is therapeutic. Warfarin regimen will be continued at current dose 5 mg every Mon, Fri and 10 mg all other days. Will retest in 4 weeks. Patient understands dosing directions and information discussed. Dosing schedule and follow up appointment given to patient. Progress note routed to referring physicians office. Discussed with patient the Pharmacist Collaborative Practice Agreement. Patient provided verbal and/or electronic (ex. MobileMDhart) consent to participate in the collaborative practice agreement between the pharmacist and referred patient. This is in lieu of paper consent due to COVID-19 precautions and the use of remote/virtual visits.        For Pharmacy Admin Tracking Only    Intervention Detail:   Total # of Interventions Recommended: 0  Total # of Interventions Accepted: 0  Time Spent (min): 15

## 2023-02-17 ENCOUNTER — TELEMEDICINE (OUTPATIENT)
Dept: PULMONOLOGY | Age: 73
End: 2023-02-17

## 2023-02-17 DIAGNOSIS — G47.33 OSA ON CPAP: Primary | ICD-10-CM

## 2023-02-17 DIAGNOSIS — R91.1 ABNORMAL X-RAY OF LUNGS WITH SINGLE PULMONARY NODULE: ICD-10-CM

## 2023-02-17 DIAGNOSIS — E66.01 CLASS 3 SEVERE OBESITY DUE TO EXCESS CALORIES WITH SERIOUS COMORBIDITY AND BODY MASS INDEX (BMI) OF 60.0 TO 69.9 IN ADULT (HCC): ICD-10-CM

## 2023-02-17 DIAGNOSIS — Z99.89 OSA ON CPAP: Primary | ICD-10-CM

## 2023-02-17 DIAGNOSIS — M17.4 OTHER SECONDARY OSTEOARTHRITIS OF BOTH KNEES: ICD-10-CM

## 2023-02-17 ASSESSMENT — SLEEP AND FATIGUE QUESTIONNAIRES
HOW LIKELY ARE YOU TO NOD OFF OR FALL ASLEEP WHILE SITTING INACTIVE IN A PUBLIC PLACE: 0
HOW LIKELY ARE YOU TO NOD OFF OR FALL ASLEEP WHILE LYING DOWN TO REST IN THE AFTERNOON WHEN CIRCUMSTANCES PERMIT: 0
HOW LIKELY ARE YOU TO NOD OFF OR FALL ASLEEP IN A CAR, WHILE STOPPED FOR A FEW MINUTES IN TRAFFIC: 0
HOW LIKELY ARE YOU TO NOD OFF OR FALL ASLEEP WHILE SITTING AND TALKING TO SOMEONE: 0
HOW LIKELY ARE YOU TO NOD OFF OR FALL ASLEEP WHILE SITTING AND READING: 0
HOW LIKELY ARE YOU TO NOD OFF OR FALL ASLEEP WHILE WATCHING TV: 0
ESS TOTAL SCORE: 0
HOW LIKELY ARE YOU TO NOD OFF OR FALL ASLEEP WHEN YOU ARE A PASSENGER IN A CAR FOR AN HOUR WITHOUT A BREAK: 0
HOW LIKELY ARE YOU TO NOD OFF OR FALL ASLEEP WHILE SITTING QUIETLY AFTER LUNCH WITHOUT ALCOHOL: 0

## 2023-02-17 ASSESSMENT — ENCOUNTER SYMPTOMS
EYES NEGATIVE: 1
RESPIRATORY NEGATIVE: 1

## 2023-02-17 NOTE — PROGRESS NOTES
2023    TELEHEALTH EVALUATION -- Audio/Visual (During EASAD-97 public health emergency)    Patient and physician are located in their individual locations. This is visit is completed via [employer] application [x]/ Doxy. me[] / Telephone []     HPI:    Alicia Pichardo (:  1950) has requested an audio/video evaluation for the following concern(s):    Patient returns for follow up of sleep apnea. Since last visit 12 months ago, patient has been very compliant with CPAP. Uses every night, for the entire night. Reports refreshing and restorative sleep. Denies snoring. Reports normal daytime alertness. Believes benefiting greatly. Compliance download from 22 to 23 shows 99% compliance for average of 7.5hrs per night. Residual AHI 3.3 Mean [id number] cm H2O. No COVID. Patient up-to-date with his COVID vaccinations. Planning a cruise this spring. Advised patient that should he develop COVID infection would seek treatment. With Paxlovid. Review of Systems   Constitutional: Negative. HENT: Negative. Eyes: Negative. Respiratory: Negative. Cardiovascular: Negative. Musculoskeletal:  Positive for gait problem. All other systems reviewed and are negative. Prior to Visit Medications    Medication Sig Taking? Authorizing Provider   warfarin (COUMADIN) 10 MG tablet Take 0.5-1 tablets by mouth daily as directed by [facility].  Yes Nelson Mayers MD   [facility] [id number] UNIT/GM powder  Yes Historical Provider, MD   amLODIPine (NORVASC) 10 MG tablet 1 tablet Yes Historical Provider, MD   acetaminophen (TYLENOL) 650 MG extended release tablet Take 650 mg by mouth every 8 hours as needed for Pain Yes Historical Provider, MD   calcium acetate (PHOSLO) 667 MG capsule Take 667 mg by mouth 2 times daily (with meals) Yes Historical Provider, MD   vitamin B-12 (CYANOCOBALAMIN) 1000 MCG tablet Take 1,000 mcg by mouth daily Yes Historical Provider, MD amitriptyline (ELAVIL) 75 MG tablet Take 75 mg by mouth nightly  Yes Historical Provider, MD       Social History     Tobacco Use    Smoking status: Never    Smokeless tobacco: Never    Tobacco comments:     smoked cigars in earky 20's   Vaping Use    Vaping Use: Never used   Substance Use Topics    Alcohol use: No    Drug use: No            RECORD REVIEW: Previous medical records were reviewed at today's visit. Wt Readings from Last 3 Encounters:   01/14/20 (!) 430 lb (195 kg)   10/07/19 (!) 413 lb (187.3 kg)   09/12/19 (!) 400 lb 6.4 oz (181.6 kg)       Results for orders placed or performed during the hospital encounter of 02/07/23   Protime-INR   Result Value Ref Range    INR 2.9    Protime-INR   Result Value Ref Range    Protime 34.8 seconds       No results found. PHYSICAL EXAMINATION:  Due to this being a TeleHealth encounter, evaluation of the following organ systems is limited: Vitals/Constitutional/EENT/Resp/CV/GI//MS/Neuro/Skin/Heme-Lymph-Imm. Constitutional: [x] Appears well-developed and well-nourished. [x] Abnormal very obese  Mental status  [x] Alert and awake  [x] Oriented to person/place/time [x]Able to follow commands    [x] No apparent distress      Eyes:  EOM    [x]  Normal  [] Abnormal-  Sclera  [x]  Normal  [] Abnormal -         Discharge [x]  None visible  [] Abnormal -    HENT:   [x] Normocephalic, atraumatic. [] Abnormal shaped head   [x] Mouth/Throat: Mucous membranes are moist.     Ears [x] Normal  [] Abnormal-    Neck: [x] Normal range of motion [x] Supple [x] No visualized mass. Pulmonary/Chest: [x] Respiratory effort normal.  [x] No visualized signs of difficulty breathing or respiratory distress        [] Abnormal      Musculoskeletal:   [x] Normal range of motion. [x] Normal gait with no signs of ataxia. [x]  No signs of cyanosis of the peripheral portions of extremities.          [] Abnormal       Neurological:        [x] Normal cranial nerve (limited exam to video visit) [x] No focal weakness observed       [] Abnormal          Speech       [x] Normal   [] Abnormal     Skin:        [x] No rash on visible skin  [x] Normal  [] Abnormal     Psychiatric:       [x] Normal  [] Abnormal        [x] Normal Mood  [] Anxious appearing        Other Pertinent Exam Findings:       ASSESSMENT:  1. STEPH on CPAP    2. Class 3 severe obesity due to excess calories with serious comorbidity and body mass index (BMI) of 60.0 to 69.9 in adult ([facility] [address]    3. Other secondary osteoarthritis of both knees    4. Abnormal x-ray of lungs with single pulmonary nodule          Plan:   Encourage CPAP use   Avoid sedative hypnotics and alcohol at bedtime  Weight loss  Exercise caution when driving and other high risk activities of not adequately treated for sleep apnea  Instructed to bring CPAP machine for use post op  New mask, tubing, and supplies. Return in 1 year. An  electronic signature was used to authenticate this note. --Moe Code, DO on 2/17/2023 at 4:12 PM    9}    Pursuant to the emergency declaration under the Former[facility], [facility] waiver authority and the [employer] and [employer] Act, this Virtual  Visit was conducted, with patient's consent, to reduce the patient's risk of exposure to COVID-19 and provide continuity of care for an established patient. Services were provided through a video synchronous discussion virtually to substitute for in-person clinic visit.

## 2023-03-02 ENCOUNTER — TELEPHONE (OUTPATIENT)
Dept: PHARMACY | Age: 73
End: 2023-03-02

## 2023-03-02 DIAGNOSIS — I82.499 DEEP VEIN THROMBOSIS (DVT) OF OTHER VEIN OF LOWER EXTREMITY, UNSPECIFIED CHRONICITY, UNSPECIFIED LATERALITY (HCC): Primary | ICD-10-CM

## 2023-03-02 RX ORDER — WARFARIN SODIUM 10 MG/1
TABLET ORAL
Qty: 90 TABLET | Refills: 1 | OUTPATIENT
Start: 2023-03-02

## 2023-03-02 NOTE — TELEPHONE ENCOUNTER
Patient LVM requesting rwarfarin refill to be sent to [facility].     For Pharmacy Admin Tracking Only    Intervention Detail: Refill(s) Provided  Total # of Interventions Recommended: 1  Total # of Interventions Accepted: 1  Time Spent (min): 10

## 2023-03-08 ENCOUNTER — HOSPITAL ENCOUNTER (OUTPATIENT)
Dept: PHARMACY | Age: 73
Setting detail: THERAPIES SERIES
Discharge: HOME | End: 2023-03-08
Payer: MEDICARE

## 2023-03-08 DIAGNOSIS — I82.499 DEEP VEIN THROMBOSIS (DVT) OF OTHER VEIN OF LOWER EXTREMITY, UNSPECIFIED CHRONICITY, UNSPECIFIED LATERALITY (HCC): Primary | ICD-10-CM

## 2023-03-08 LAB
INR BLD: 1.8
PROTIME: 21.1 SECONDS

## 2023-03-08 PROCEDURE — 85610 PROTHROMBIN TIME: CPT

## 2023-03-08 PROCEDURE — 99211 OFF/OP EST MAY X REQ PHY/QHP: CPT

## 2023-03-08 NOTE — PROGRESS NOTES
Patient seen in clinic for warfarin management due to DVT with an INR goal of 2.0-3.0. Estimated duration of therapy is indefinite. Patient states  he may have missed a dose over the last week . No bleeding or thromboembolic side effects noted. No significant med or dietary changes. No significant recent illness or disease state changes. PT/INR done in office per protocol. INR is 1.8 which is just below goal.     Warfarin regimen will be continued at current dose of 5mg mon/fri and 10mg all other days. Will retest in 4 weeks. Patient understands dosing directions and information discussed. Dosing schedule and follow up appointment given to patient. Progress note routed to referring physicians office. Discussed with patient the Pharmacist Collaborative Practice Agreement. Patient provided verbal and/or electronic (ex. Sazneot) consent to participate in the collaborative practice agreement between the pharmacist and referred patient. This is in lieu of paper consent due to COVID-19 precautions and the use of remote/virtual visits.        For Pharmacy Admin Tracking Only    Intervention Detail:   Total # of Interventions Recommended: 0  Total # of Interventions Accepted: 0  Time Spent (min): 15

## 2023-04-05 ENCOUNTER — HOSPITAL ENCOUNTER (OUTPATIENT)
Dept: PHARMACY | Age: 73
Setting detail: THERAPIES SERIES
Discharge: HOME | End: 2023-04-05
Payer: MEDICARE

## 2023-04-05 DIAGNOSIS — I82.499 DEEP VEIN THROMBOSIS (DVT) OF OTHER VEIN OF LOWER EXTREMITY, UNSPECIFIED CHRONICITY, UNSPECIFIED LATERALITY (HCC): Primary | ICD-10-CM

## 2023-04-05 LAB
INR BLD: 1.9
PROTIME: 23 SECONDS

## 2023-04-05 PROCEDURE — 85610 PROTHROMBIN TIME: CPT

## 2023-04-05 PROCEDURE — 99211 OFF/OP EST MAY X REQ PHY/QHP: CPT

## 2023-04-05 NOTE — PROGRESS NOTES
Patient seen in clinic for warfarin management due to DVT with an INR goal of 2.0-3.0. Estimated duration of therapy is indefinite. Patient states may have missed one dose. No bleeding or thromboembolic side effects noted. No significant med or dietary changes. No significant recent illness or disease state changes. Patient will be going on a cruise next week and plans on drinking 2-3 alcoholic drinks daily. I encouraged him to stick with 2 drinks per day to minimize INR elevation. PT/INR done in office per protocol. INR is 1.9 which is just below goal.     Warfarin regimen will be continued at current dose of 5mg mon/fri and 10mg all other days. Will retest in 3 weeks right after patient gets back from his cruise - to assess whether dose needs to be held due to alcohol consumption. Patient understands dosing directions and information discussed. Dosing schedule and follow up appointment given to patient. Progress note routed to referring physicians office. Discussed with patient the Pharmacist Collaborative Practice Agreement. Patient provided verbal and/or electronic (ex. Zipideehart) consent to participate in the collaborative practice agreement between the pharmacist and referred patient. This is in lieu of paper consent due to COVID-19 precautions and the use of remote/virtual visits.        For Pharmacy Admin Tracking Only    Intervention Detail:   Total # of Interventions Recommended: 0  Total # of Interventions Accepted: 0  Time Spent (min): 15

## 2023-04-24 ENCOUNTER — HOSPITAL ENCOUNTER (OUTPATIENT)
Dept: PHARMACY | Age: 73
Setting detail: THERAPIES SERIES
Discharge: HOME | End: 2023-04-24
Payer: MEDICARE

## 2023-04-24 DIAGNOSIS — I82.499 DEEP VEIN THROMBOSIS (DVT) OF OTHER VEIN OF LOWER EXTREMITY, UNSPECIFIED CHRONICITY, UNSPECIFIED LATERALITY (HCC): Primary | ICD-10-CM

## 2023-04-24 LAB
INR BLD: 4.4
PROTIME: 53.1 SECONDS

## 2023-04-24 PROCEDURE — 99212 OFFICE O/P EST SF 10 MIN: CPT

## 2023-04-24 PROCEDURE — 85610 PROTHROMBIN TIME: CPT

## 2023-04-24 NOTE — PROGRESS NOTES
Patient seen in clinic for warfarin management due to DVT with an INR goal of 2.0-3.0. Estimated duration of therapy is indefinite. Patient states compliant most of the time with regimen. Patient took 10 mg everyday last week instead of instructed regimen of 5 mg Mon, Fri and 10 mg all other days. He states he thought his alcohol would decrease his INR - re-educated patient on dietary effects on INR. No bleeding or thromboembolic side effects noted. No significant med changes. Patient did have dietary change last week while on cruise. No significant recent illness or disease state changes. PT/INR done in office per protocol. INR is 4.4 which is supratherapeutic. Warfarin regimen will be held for today and then continued back at instructed regimen of 5 mg Mon, Fri and 10 mg all other days  Will retest in 2 weeks. Patient understands dosing directions and information discussed. Dosing schedule and follow up appointment given to patient. Progress note routed to referring physicians office. Discussed with patient the Pharmacist Collaborative Practice Agreement. Patient provided verbal and/or electronic (ex. MeroArtehart) consent to participate in the collaborative practice agreement between the pharmacist and referred patient. This is in lieu of paper consent due to COVID-19 precautions and the use of remote/virtual visits.        For Pharmacy Admin Tracking Only    Intervention Detail: Adherence Monitorin  Total # of Interventions Recommended: 2  Total # of Interventions Accepted: 2  Time Spent (min): 15

## 2023-05-08 ENCOUNTER — HOSPITAL ENCOUNTER (OUTPATIENT)
Dept: PHARMACY | Age: 73
Setting detail: THERAPIES SERIES
Discharge: HOME | End: 2023-05-08
Payer: MEDICARE

## 2023-05-08 ENCOUNTER — APPOINTMENT (OUTPATIENT)
Dept: PHARMACY | Age: 73
End: 2023-05-08
Payer: MEDICARE

## 2023-05-08 DIAGNOSIS — I82.499 DEEP VEIN THROMBOSIS (DVT) OF OTHER VEIN OF LOWER EXTREMITY, UNSPECIFIED CHRONICITY, UNSPECIFIED LATERALITY (HCC): Primary | ICD-10-CM

## 2023-05-08 LAB
INR BLD: 2.5
PROTIME: 29.9 SECONDS

## 2023-05-08 PROCEDURE — 99211 OFF/OP EST MAY X REQ PHY/QHP: CPT

## 2023-05-08 PROCEDURE — 85610 PROTHROMBIN TIME: CPT

## 2023-05-08 NOTE — PROGRESS NOTES
Patient seen in clinic for warfarin management due to DVT with an INR goal of 2.0-3.0. Estimated duration of therapy is indefinite. Patient states compliant all of the time with regimen. No bleeding or thromboembolic side effects noted. No significant med or dietary changes. No significant recent illness or disease state changes. PT/INR done in office per protocol. INR is 2.5 which is therapeutic. Warfarin regimen will be continued at current dose of 5mg Mon/Fri and 10mg all other days. Will retest in 4 weeks. Patient understands dosing directions and information discussed. Dosing schedule and follow up appointment given to patient. Progress note routed to referring physicians office. Discussed with patient the Pharmacist Collaborative Practice Agreement. Patient provided verbal and/or electronic (ex. EverTunehart) consent to participate in the collaborative practice agreement between the pharmacist and referred patient. This is in lieu of paper consent due to COVID-19 precautions and the use of remote/virtual visits.        For Pharmacy Admin Tracking Only    Intervention Detail:   Total # of Interventions Recommended: 0  Total # of Interventions Accepted: 0  Time Spent (min): 15

## 2023-06-06 ENCOUNTER — HOSPITAL ENCOUNTER (OUTPATIENT)
Dept: PHARMACY | Age: 73
Setting detail: THERAPIES SERIES
Discharge: HOME | End: 2023-06-06
Payer: MEDICARE

## 2023-06-06 DIAGNOSIS — I82.499 DEEP VEIN THROMBOSIS (DVT) OF OTHER VEIN OF LOWER EXTREMITY, UNSPECIFIED CHRONICITY, UNSPECIFIED LATERALITY (HCC): Primary | ICD-10-CM

## 2023-06-06 LAB
INR BLD: 4.2
PROTIME: 50.7 SECONDS

## 2023-06-06 PROCEDURE — 85610 PROTHROMBIN TIME: CPT

## 2023-06-06 PROCEDURE — 99212 OFFICE O/P EST SF 10 MIN: CPT

## 2023-06-26 ENCOUNTER — TELEPHONE (OUTPATIENT)
Dept: PHARMACY | Age: 73
End: 2023-06-26

## 2023-06-26 ENCOUNTER — APPOINTMENT (OUTPATIENT)
Dept: PHARMACY | Age: 73
End: 2023-06-26
Payer: MEDICARE

## 2023-06-26 DIAGNOSIS — I82.499 DEEP VEIN THROMBOSIS (DVT) OF OTHER VEIN OF LOWER EXTREMITY, UNSPECIFIED CHRONICITY, UNSPECIFIED LATERALITY (HCC): Primary | ICD-10-CM
